# Patient Record
Sex: MALE
[De-identification: names, ages, dates, MRNs, and addresses within clinical notes are randomized per-mention and may not be internally consistent; named-entity substitution may affect disease eponyms.]

---

## 2024-03-19 PROBLEM — Z00.00 ENCOUNTER FOR PREVENTIVE HEALTH EXAMINATION: Status: ACTIVE | Noted: 2024-03-19

## 2024-04-02 ENCOUNTER — APPOINTMENT (OUTPATIENT)
Dept: UROLOGY | Facility: CLINIC | Age: 69
End: 2024-04-02
Payer: MEDICARE

## 2024-04-02 VITALS
DIASTOLIC BLOOD PRESSURE: 67 MMHG | HEART RATE: 81 BPM | TEMPERATURE: 98.5 F | BODY MASS INDEX: 37.51 KG/M2 | SYSTOLIC BLOOD PRESSURE: 111 MMHG | OXYGEN SATURATION: 97 % | WEIGHT: 283 LBS | RESPIRATION RATE: 14 BRPM | HEIGHT: 73 IN

## 2024-04-02 DIAGNOSIS — N40.1 BENIGN PROSTATIC HYPERPLASIA WITH LOWER URINARY TRACT SYMPMS: ICD-10-CM

## 2024-04-02 PROCEDURE — 99204 OFFICE O/P NEW MOD 45 MIN: CPT

## 2024-04-02 PROCEDURE — G2211 COMPLEX E/M VISIT ADD ON: CPT

## 2024-04-03 NOTE — HISTORY OF PRESENT ILLNESS
[FreeTextEntry1] : ARLENE MORALES Feb 1 1955   Language: English Date of First visit: 04/02/2024 Accompanied by: self Contact info: Referring Provider/PCP: Dr. Rogers Fax: [phone]   CC/ Problem List:  BPH/LUTs =============================================================================== FIRST VISIT / Summary: Very pleasant 69 year old M here for BPH/LUTs. Has frequency, urgency with sometimes incontinence and nocturia 2-3x. Sometimes weak stream, incomplete bladder emptying and hesitancy. Currently on tamsulosin 0.4 mg daily.  Most bothered by nocturia. Drinks 1.5 Liters water daily. Also drinks throughout the night 2-3 glasses if thirsty. Also currently fasting for Ramadan.   Reports mild constipation. Denies dysuria, hematuria, strain to void,  ------------------------------------------------------------------------------------------- INTERVAL VISITS:   ===============================================================================   PMH: BPH, DM, HTN,  Meds: aspirin, tamsulosin, metformin, amlodipine, furosemide, ezetimibe, potassium, Ozempic, ranolazine, senna, losartan, atorvastatin, labetalol All: denies FHx: no  malignancies SocHx: former smoker of hookah x 15 years quit 2012, no Etoh, Uber    PSH: hemorrhoids 2022, ?3 vessel CABG 2019, abdominal sx d/t trauma 2000   ROS: Review of Systems is as per HPI unless otherwise denoted below   =============================================================================== DATA: LABS (SELECTED):-----------------------------------------------------------------------------------------  3/19/24 Hgb A1c 5.9, BUN/Cr 24/1.80, eGFR 40, PSA 2.95   RADS:-------------------------------------------------------------------------------------------------------------------     PATHOLOGY/CYTOLOGY:-------------------------------------------------------------------------------------------     VOIDING STUDIES: ----------------------------------------------------------------------------------------  04/02/2024 PVR 0 ml      STONE STUDIES: (Analysis/LLSA)----------------------------------------------------------------------------------     PROCEDURES: -----------------------------------------------------------------------------------------------       =============================================================================== PHYSICAL EXAM:    FOCUSED: ----------------------------------------------------------------------------------------------  mani Cline NP  prostate non tender, no nodules appreciated, mildly enlarged ======================================================================================= DISCUSSION: ======================================================================================= ASSESSMENT and PLAN   1. BPH/LUTs  -UCx -recommend US for prostate size and can also consider cystoscopy -advised to restrict fluids 2-3 hours before bed, only sip at night if dry mouth -can consider taking tamsulosin 0.8 mg nightly -can consider dual therapy by adding Finasteride - based on US volume -recommend uroflow at next visit -f/u in 4 weeks   =======================================================================================   Thank you for allowing me to assist in the care of your patient. Should you have any questions please do not hesitate to reach out to me.     Bal Kamara MD                                                         [facility] Physician [facility]   [place] Office: [address], [address] [place] [address] T: [phone] F: [phone]   [place] Office: [address], 1st [place] [address] T: [phone] F: [phone]

## 2024-04-04 LAB — BACTERIA UR CULT: NORMAL

## 2024-04-11 ENCOUNTER — NON-APPOINTMENT (OUTPATIENT)
Age: 69
End: 2024-04-11

## 2024-04-16 ENCOUNTER — APPOINTMENT (OUTPATIENT)
Dept: UROLOGY | Facility: CLINIC | Age: 69
End: 2024-04-16
Payer: MEDICARE

## 2024-04-16 VITALS
HEART RATE: 72 BPM | RESPIRATION RATE: 14 BRPM | HEIGHT: 73 IN | SYSTOLIC BLOOD PRESSURE: 114 MMHG | WEIGHT: 280 LBS | OXYGEN SATURATION: 98 % | BODY MASS INDEX: 37.11 KG/M2 | DIASTOLIC BLOOD PRESSURE: 73 MMHG

## 2024-04-16 PROCEDURE — G2211 COMPLEX E/M VISIT ADD ON: CPT

## 2024-04-16 PROCEDURE — 99214 OFFICE O/P EST MOD 30 MIN: CPT

## 2024-04-16 RX ORDER — FINASTERIDE 5 MG/1
5 TABLET, FILM COATED ORAL DAILY
Qty: 90 | Refills: 3 | Status: ACTIVE | COMMUNITY
Start: 2024-04-16 | End: 1900-01-01

## 2024-04-16 NOTE — HISTORY OF PRESENT ILLNESS
"Rheumatology Clinic Visit      Bucky Edgar MRN# [id number]   YOB: 1958 Age: 60 year old      Date of visit: 11/13/18   PCP: Dr. Percy Deshpande    Chief Complaint   Patient presents with:  Follow Up For: Idiopathic chronic gout of multiple sites without tophus      Assessment and Plan     1.  Gout: Started having gout flares in approximately early 2017, acute onset and resolved with prednisone; joints involved include his left ankle, left first MTP, and left wrist.  Colchicine was cost prohibitive.  Currently on allopurinol 700 mg daily and is doing well with no gout flares.  Uric acid is low, lower than the standard \"less than 6\", but this level has achieved control of his gout flares.   - Continue allopurinol 700 mg daily (two 300mg tablets + one 100mg tablet)  - For gout flare only: prednisone 60mg daily x2days, then 40mg daily x5days, then 20mg daily x5days, then stop.  - Labs in 6 months: CBC, Cr, Hepatic Panel, Uric Acid    2. Hypertersion: Patient to follow up with Primary Care provider regarding elevated blood pressure.     3. Chronic low back pain: worse recently. Status-post 5 fusion surgeries per patient, last being about 20 years ago at the [facility].  He does not want to consider imaging or seeing a surgeon until he gets his work-comp issue figured out because he says that he should be \"covered for life\" for his back issues but needs to contact that work comp insurance first.  Will refer to orthopedic surgery at the [facility] where he was treated previously. He plans to make an appointment only after the insurance issue is figured out first.   - Orthopedic surgery referral    Mr. Edgar verbalized agreement with and understanding of the rational for the diagnosis and treatment plan.  All questions were answered to best of my ability and the patient's satisfaction. Mr. Edgar was advised to contact the clinic with any questions that may arise after the clinic visit.     Thank " [FreeTextEntry1] : ARLENE MORALES Feb 1 1955  Language: English Date of First visit: 04/02/2024 Accompanied by: self Contact info: Referring Provider/PCP: Dr. Rogers Fax: [phone]  CC/ Problem List: BPH/LUTs =============================================================================== FIRST VISIT / Summary: Very pleasant 69 year old M here for BPH/LUTs. Has frequency, urgency with sometimes incontinence and nocturia 2-3x. Sometimes weak stream, incomplete bladder emptying and hesitancy. Currently on tamsulosin 0.4 mg daily. Most bothered by nocturia. Drinks 1.5 Liters water daily. Also drinks throughout the night 2-3 glasses if thirsty. Also currently fasting for Ramadan. Reports mild constipation. Denies dysuria, hematuria, strain to void ------------------------------------------------------------------------------------------- INTERVAL VISITS: The patient's medications and allergies were reviewed and edited below. Dated 04/16/2024   ED: He has some morning erections, without meds 4-5/10 with sildenafil is sufficient.  ===============================================================================  PMH: BPH, DM, HTN, Meds: aspirin, tamsulosin, metformin, amlodipine, furosemide, ezetimibe, potassium, Ozempic, ranolazine, senna, losartan, atorvastatin, labetalol All: denies FHx: no  malignancies SocHx: former smoker of hookah x 15 years quit 2012, no Etoh, Uber   PSH: hemorrhoids 2022, ?3 vessel CABG 2019, abdominal sx d/t trauma 2000  ROS: Review of Systems is as per HPI unless otherwise denoted below  =============================================================================== DATA: LABS (SELECTED):----------------------------------------------------------------------------------------- 3/19/24 Hgb A1c 5.9, BUN/Cr 24/1.80, eGFR 40, PSA 2.95  RADS:------------------------------------------------------------------------------------------------------------------- 4/11/24: RBUS: kidneys normal, prostate 65.6cc PVR 6mL  PATHOLOGY/CYTOLOGY:-------------------------------------------------------------------------------------------   VOIDING STUDIES: ---------------------------------------------------------------------------------------- 04/02/2024 PVR 0 ml   STONE STUDIES: (Analysis/LLSA)----------------------------------------------------------------------------------   PROCEDURES: -----------------------------------------------------------------------------------------------    =============================================================================== PHYSICAL EXAM:   FOCUSED: ---------------------------------------------------------------------------------------------- mani Cline NP prostate non tender, no nodules appreciated, mildly enlarged ======================================================================================= DISCUSSION: ======================================================================================= ASSESSMENT and PLAN  1. BPH/LUTs -UCx negative -advised to restrict fluids 2-3 hours before bed, only sip at night if dry mouth -tamsulosin 0.8 mg nightly -start dual therapy by adding Finasteride - based on US volume -recommend uroflow at next visit   2. ED - has sildenafil from cardiologist. Happy with it  ======================================================================================= 4g Thank you for allowing me to assist in the care of your patient. Should you have any questions please do not hesitate to reach out to me.   Bal Kamara MD       [facility] Physician [facility]  [place] Office: [address], [address] [place] [address] T: [phone] F: [phone]  [place] Office: [address], 1st [place] [address] T: [phone] F: [phone]  you for involving me in the care of the patient    Return to clinic: 6 months      HPI   Bucky Edgar is a 60 year old male with a past medical history significant for COPD, dyslipidemia, vitamin D deficiency, history of rib fractures, DVT on anticoagulation, hypertension, who is seen for follow-up of gout.    Today, Mr. Edgar reports that he has not had a gout flare since last seen.  Tolerating allopurinol well.  He has not had to use colchicine for gout flare.  He used prednisone for his back pain that was helpful for his back, he did not use it for a gout flare.  He reports having 5 back fusion surgeries that were work comp related, 20 or more years ago at the [facility].  He says that he was recently given prednisone that was helpful for his back pain.  Back pain is worse when he lays flat, and worse with activity.      Denies fevers, chills, nausea, vomiting, constipation, diarrhea. No abdominal pain. No chest pain/pressure, palpitations, or shortness of breath. No LE swelling.  No rash.   No history of inflammatory eye disease or inflammatory bowel disease.      Tobacco: Quit in 2017  EtOH: 12 pack of beer every 2 weeks  Drugs: None    ROS   GEN: No fevers, chills, or night sweats  SKIN: No itching, rashes, sores  HEENT: No oral or nasal ulcers.  CV: No chest pain, pressure, palpitations, or dyspnea on exertion.  PULM: No SOB, wheeze, cough.  GI: No nausea, vomiting, constipation, diarrhea. No blood in stool. No abdominal pain.  : No blood in urine.  MSK: See HPI.  NEURO: No numbness, tingling, or weakness.  EXT: No LE swelling  PSYCH: Negative    Active Problem List     Patient Active Problem List   Diagnosis     Displacement of lumbar intervertebral disc without myelopathy     Tobacco use disorder     CARDIOVASCULAR SCREENING; LDL GOAL LESS THAN 130     HTN, goal below 140/90     Advanced directives, counseling/discussion     COPD (chronic obstructive pulmonary disease) (H)     Impaired  fasting glucose     Hypertriglyceridemia     Vitamin D deficiency     Chronic bronchitis with COPD (chronic obstructive pulmonary disease) (H)     Closed fracture of multiple ribs of left side with routine healing, subsequent encounter     Chronic pain due to trauma     Long-term (current) use of anticoagulants [Z79.01]     Elevated serum creatinine     History of deep venous thrombosis (DVT) of distal vein of left lower extremity     Past Medical History     Past Medical History:   Diagnosis Date     COPD (chronic obstructive pulmonary disease) (H)      Displacement of lumbar intervertebral disc without myelopathy 1995, 2002     HTN, goal below 140/90      Past Surgical History     Past Surgical History:   Procedure Laterality Date     C DECOMPRESS SPINAL CORD,1 SEG  1995, 4/2002    leftL4-L5, 2nd right L4-L5     C SPINE FUSION,ANTER,3 SGMTS  2/9/2004    ant and post fusion L4-S1     HC REPAIR ROTATOR CUFF,CHRONIC  1989     Allergy     Allergies   Allergen Reactions     Chlorthalidone Other (See Comments)     Excessive lowering of blood pressure     Amlodipine Other (See Comments)     Intractable headache with use of medication as well as noting a small tremor of the upper extremities     Current Medication List     Current Outpatient Prescriptions   Medication Sig     acetaminophen (TYLENOL) 325 MG tablet Take 650 mg by mouth every 6 hours     albuterol (PROAIR HFA, PROVENTIL HFA, VENTOLIN HFA) 108 (90 BASE) MCG/ACT inhaler Inhale 2 puffs into the lungs every 6 hours as needed for shortness of breath / dyspnea     allopurinol (ZYLOPRIM) 300 MG tablet Allopurinol 700mg daily (two 300mg tablets + one 100mg tablet)     aspirin 81 MG tablet Take 1 tablet (81 mg) by mouth daily     carvedilol (COREG) 25 MG tablet TAKE ONE TABLET BY MOUTH TWICE DAILY WITH MEALS     colchicine (COLCRYS) 0.6 MG tablet PRN gout flare: 1.2mg (2 tablets) once followed by 0.6mg (1 tablet) 1 hour later; do not take more than 1.8mg within a  "week period.     lisinopril (PRINIVIL/ZESTRIL) 40 MG tablet Take 1 tablet (40 mg) by mouth daily     nicotine polacrilex (NICORETTE) 2 MG lozenge Place 2 mg inside cheek every hour as needed for smoking cessation     NICOTINE STEP 1 21 MG/24HR 24 hr patch ROBBY 1 PATCH TO SKIN ONCE D X 42 DAYS     predniSONE (DELTASONE) 20 MG tablet Take 1 tablet (20 mg) by mouth 2 times daily     sennosides (SENOKOT) 8.6 MG tablet TK 1 T PO BID     No current facility-administered medications for this visit.          Social History   See HPI    Family History     Family History   Problem Relation Age of Onset     Family History Negative Other      Diabetes No family hx of      Coronary Artery Disease No family hx of      Hypertension No family hx of      Hyperlipidemia No family hx of      Breast Cancer No family hx of      Prostate Cancer No family hx of      Anxiety Disorder No family hx of      Substance Abuse No family hx of      Asthma No family hx of      Thyroid Disease No family hx of      Unknown/Adopted No family hx of      Genetic Disorder No family hx of      Osteoporosis No family hx of      Anesthesia Reaction No family hx of      Mental Illness No family hx of      Depression No family hx of      Other Cancer No family hx of      Colon Cancer No family hx of      Cerebrovascular Disease No family hx of      Obesity No family hx of        Physical Exam     Temp Readings from Last 3 Encounters:   11/01/18 98.6  F (37  C) (Temporal)   07/03/18 98.4  F (36.9  C) (Oral)   05/02/18 98.9  F (37.2  C) (Temporal)     BP Readings from Last 5 Encounters:   11/13/18 (!) 149/91   11/01/18 114/82   07/03/18 (!) 198/118   05/02/18 136/86   04/04/18 132/84     Pulse Readings from Last 1 Encounters:   11/13/18 73     Resp Readings from Last 1 Encounters:   11/01/18 18     Estimated body mass index is 34.11 kg/(m^2) as calculated from the following:    Height as of 11/1/18: 1.753 m (5' 9\").    Weight as of this encounter: 104.8 kg (231 " lb).    GEN: NAD; overweight  HEENT: MMM. No oral lesions.  Anicteric, noninjected sclera  CV: S1, S2. RRR. No m/r/g.  PULM: CTA bilaterally. No w/c.  ABD: +BS.  MSK: MCPs, PIPs, wrists, elbows, shoulders, knees, and ankles without swelling or tenderness to palpation.  Negative MCP and MTP squeeze.   Hips nontender to palpation.    NEURO: UE and LE strengths 5/5 and equal bilaterally.   SKIN: No rash  EXT: No LE edema  PSYCH: Alert. Appropriate.    Labs / Imaging (select studies)   CBC  Recent Labs   Lab Test  08/07/18   0926  05/02/18   1039  04/04/18   1023  03/23/18   0918   WBC  7.6  11.1*  12.4*  13.0*   RBC  5.65  4.83  4.82  4.95   HGB  17.8*  15.0  15.0  15.5   HCT  54.3*  45.5  46.1  46.8   MCV  96  94  96  95   RDW  15.4*  14.5  13.9  13.6   PLT  241  230  228  307   MCH  31.5  31.1  31.1  31.3   MCHC  32.8  33.0  32.5  33.1   NEUTROPHIL  68.6   --   73.1  72.5   LYMPH  19.4   --   15.2  13.3   MONOCYTE  8.3   --   8.4  12.4   EOSINOPHIL  3.2   --   3.1  1.2   BASOPHIL  0.5   --   0.2  0.6   ANEU  5.2   --   9.0*  9.4*   ALYM  1.5   --   1.9  1.7   LIZZ  0.6   --   1.0  1.6*   AEOS  0.2   --   0.4  0.2   ABAS  0.0   --   0.0  0.1     CMP  Recent Labs   Lab Test  08/07/18   0926  05/02/18   1039  03/23/18   0918  02/28/18   1047   NA   --   142  140  139   POTASSIUM   --   4.4  3.9  5.0   CHLORIDE   --   111*  108  106   CO2   --   27  21  27   ANIONGAP   --   4  11  6   GLC   --   102*  106*  107*   BUN   --   15  10  17   CR  1.16  1.11  1.06  1.27*   GFRESTIMATED  64  67  71  58*   GFRESTBLACK  77  82  86  70   JUSTIN   --   8.9  9.4  9.2   BILITOTAL  0.4  0.8   --   0.7   ALBUMIN  3.3*  3.4   --   3.2*   PROTTOTAL  6.8  6.8   --   6.7*   ALKPHOS  108  67   --   72   AST  17  18   --   14   ALT  29  26   --   31     Uric Acid  Recent Labs   Lab Test  08/07/18   0926  05/02/18   1039  03/23/18   0918  02/28/18   1047  02/05/18   1212  12/12/16   1055   URIC  2.0*  3.3*  5.0  6.9  7.8*  7.1     Immunization  History     Immunization History   Administered Date(s) Administered     Influenza Vaccine IM 3yrs+ 4 Valent IIV4 11/27/2013, 10/29/2015, 12/12/2016, 10/23/2017, 11/01/2018     Mantoux Tuberculin Skin Test 07/17/2012     Pneumo Conj 13-V (2010&after) 01/22/2016     Pneumococcal 23 valent 11/27/2013     TDAP Vaccine (Adacel) 08/25/2009     TDAP Vaccine (Boostrix) 08/25/2009          Chart documentation done in part with Dragon Voice recognition Software. Although reviewed after completion, some word and grammatical error may remain.    Coy Bolaños MD

## 2024-07-11 ENCOUNTER — INPATIENT (INPATIENT)
Facility: HOSPITAL | Age: 69
LOS: 2 days | Discharge: HOME | DRG: 311 | End: 2024-07-14
Attending: INTERNAL MEDICINE | Admitting: INTERNAL MEDICINE
Payer: COMMERCIAL

## 2024-07-11 VITALS
SYSTOLIC BLOOD PRESSURE: 84 MMHG | HEIGHT: 73 IN | HEART RATE: 95 BPM | OXYGEN SATURATION: 96 % | TEMPERATURE: 98 F | DIASTOLIC BLOOD PRESSURE: 54 MMHG | WEIGHT: 270.95 LBS | RESPIRATION RATE: 16 BRPM

## 2024-07-11 DIAGNOSIS — I20.0 UNSTABLE ANGINA: ICD-10-CM

## 2024-07-11 DIAGNOSIS — I10 ESSENTIAL (PRIMARY) HYPERTENSION: ICD-10-CM

## 2024-07-11 DIAGNOSIS — E78.5 HYPERLIPIDEMIA, UNSPECIFIED: ICD-10-CM

## 2024-07-11 DIAGNOSIS — I25.10 ATHEROSCLEROTIC HEART DISEASE OF NATIVE CORONARY ARTERY WITHOUT ANGINA PECTORIS: ICD-10-CM

## 2024-07-11 DIAGNOSIS — R42 DIZZINESS AND GIDDINESS: ICD-10-CM

## 2024-07-11 DIAGNOSIS — Z87.438 PERSONAL HISTORY OF OTHER DISEASES OF MALE GENITAL ORGANS: ICD-10-CM

## 2024-07-11 DIAGNOSIS — N17.9 ACUTE KIDNEY FAILURE, UNSPECIFIED: ICD-10-CM

## 2024-07-11 DIAGNOSIS — I25.10 ATHEROSCLEROTIC HEART DISEASE OF NATIVE CORONARY ARTERY WITHOUT ANGINA PECTORIS: Chronic | ICD-10-CM

## 2024-07-11 DIAGNOSIS — E11.9 TYPE 2 DIABETES MELLITUS WITHOUT COMPLICATIONS: ICD-10-CM

## 2024-07-11 DIAGNOSIS — R07.9 CHEST PAIN, UNSPECIFIED: ICD-10-CM

## 2024-07-11 DIAGNOSIS — Z29.9 ENCOUNTER FOR PROPHYLACTIC MEASURES, UNSPECIFIED: ICD-10-CM

## 2024-07-11 DIAGNOSIS — E87.6 HYPOKALEMIA: ICD-10-CM

## 2024-07-11 LAB
ALBUMIN SERPL ELPH-MCNC: 3.5 G/DL — SIGNIFICANT CHANGE UP (ref 3.5–5)
ALP SERPL-CCNC: 78 U/L — SIGNIFICANT CHANGE UP (ref 40–120)
ALT FLD-CCNC: 36 U/L DA — SIGNIFICANT CHANGE UP (ref 10–60)
ANION GAP SERPL CALC-SCNC: 8 MMOL/L — SIGNIFICANT CHANGE UP (ref 5–17)
APPEARANCE UR: CLEAR — SIGNIFICANT CHANGE UP
APTT BLD: 31.6 SEC — SIGNIFICANT CHANGE UP (ref 24.5–35.6)
AST SERPL-CCNC: 25 U/L — SIGNIFICANT CHANGE UP (ref 10–40)
BASOPHILS # BLD AUTO: 0.02 K/UL — SIGNIFICANT CHANGE UP (ref 0–0.2)
BASOPHILS NFR BLD AUTO: 0.2 % — SIGNIFICANT CHANGE UP (ref 0–2)
BILIRUB SERPL-MCNC: 0.7 MG/DL — SIGNIFICANT CHANGE UP (ref 0.2–1.2)
BILIRUB UR-MCNC: NEGATIVE — SIGNIFICANT CHANGE UP
BUN SERPL-MCNC: 26 MG/DL — HIGH (ref 7–18)
CALCIUM SERPL-MCNC: 9 MG/DL — SIGNIFICANT CHANGE UP (ref 8.4–10.5)
CHLORIDE SERPL-SCNC: 104 MMOL/L — SIGNIFICANT CHANGE UP (ref 96–108)
CO2 SERPL-SCNC: 28 MMOL/L — SIGNIFICANT CHANGE UP (ref 22–31)
COLOR SPEC: YELLOW — SIGNIFICANT CHANGE UP
CREAT ?TM UR-MCNC: 250 MG/DL — SIGNIFICANT CHANGE UP
CREAT SERPL-MCNC: 2.8 MG/DL — HIGH (ref 0.5–1.3)
DIFF PNL FLD: NEGATIVE — SIGNIFICANT CHANGE UP
EGFR: 24 ML/MIN/1.73M2 — LOW
EOSINOPHIL # BLD AUTO: 0.1 K/UL — SIGNIFICANT CHANGE UP (ref 0–0.5)
EOSINOPHIL NFR BLD AUTO: 0.9 % — SIGNIFICANT CHANGE UP (ref 0–6)
EPI CELLS # UR: PRESENT
GAS PNL BLDV: SIGNIFICANT CHANGE UP
GLUCOSE BLDC GLUCOMTR-MCNC: 95 MG/DL — SIGNIFICANT CHANGE UP (ref 70–99)
GLUCOSE SERPL-MCNC: 100 MG/DL — HIGH (ref 70–99)
GLUCOSE UR QL: 100 MG/DL
HCT VFR BLD CALC: 39.1 % — SIGNIFICANT CHANGE UP (ref 39–50)
HGB BLD-MCNC: 13.1 G/DL — SIGNIFICANT CHANGE UP (ref 13–17)
IMM GRANULOCYTES NFR BLD AUTO: 0.4 % — SIGNIFICANT CHANGE UP (ref 0–0.9)
INR BLD: 1.05 RATIO — SIGNIFICANT CHANGE UP (ref 0.85–1.18)
KETONES UR-MCNC: ABNORMAL MG/DL
LEUKOCYTE ESTERASE UR-ACNC: NEGATIVE — SIGNIFICANT CHANGE UP
LYMPHOCYTES # BLD AUTO: 1.65 K/UL — SIGNIFICANT CHANGE UP (ref 1–3.3)
LYMPHOCYTES # BLD AUTO: 15.5 % — SIGNIFICANT CHANGE UP (ref 13–44)
MAGNESIUM SERPL-MCNC: 1.7 MG/DL — SIGNIFICANT CHANGE UP (ref 1.6–2.6)
MCHC RBC-ENTMCNC: 30.8 PG — SIGNIFICANT CHANGE UP (ref 27–34)
MCHC RBC-ENTMCNC: 33.5 GM/DL — SIGNIFICANT CHANGE UP (ref 32–36)
MCV RBC AUTO: 92 FL — SIGNIFICANT CHANGE UP (ref 80–100)
MONOCYTES # BLD AUTO: 0.99 K/UL — HIGH (ref 0–0.9)
MONOCYTES NFR BLD AUTO: 9.3 % — SIGNIFICANT CHANGE UP (ref 2–14)
NEUTROPHILS # BLD AUTO: 7.83 K/UL — HIGH (ref 1.8–7.4)
NEUTROPHILS NFR BLD AUTO: 73.7 % — SIGNIFICANT CHANGE UP (ref 43–77)
NITRITE UR-MCNC: NEGATIVE — SIGNIFICANT CHANGE UP
NRBC # BLD: 0 /100 WBCS — SIGNIFICANT CHANGE UP (ref 0–0)
OSMOLALITY UR: 601 MOS/KG — SIGNIFICANT CHANGE UP (ref 50–1200)
PH UR: 5.5 — SIGNIFICANT CHANGE UP (ref 5–8)
PLATELET # BLD AUTO: 197 K/UL — SIGNIFICANT CHANGE UP (ref 150–400)
POTASSIUM SERPL-MCNC: 3.3 MMOL/L — LOW (ref 3.5–5.3)
POTASSIUM SERPL-SCNC: 3.3 MMOL/L — LOW (ref 3.5–5.3)
PROT SERPL-MCNC: 7.5 G/DL — SIGNIFICANT CHANGE UP (ref 6–8.3)
PROT UR-MCNC: 30 MG/DL
PROTHROM AB SERPL-ACNC: 11.9 SEC — SIGNIFICANT CHANGE UP (ref 9.5–13)
RBC # BLD: 4.25 M/UL — SIGNIFICANT CHANGE UP (ref 4.2–5.8)
RBC # FLD: 12.4 % — SIGNIFICANT CHANGE UP (ref 10.3–14.5)
RBC CASTS # UR COMP ASSIST: 0 /HPF — SIGNIFICANT CHANGE UP (ref 0–4)
SODIUM SERPL-SCNC: 140 MMOL/L — SIGNIFICANT CHANGE UP (ref 135–145)
SODIUM UR-SCNC: 6 MMOL/L — SIGNIFICANT CHANGE UP
SP GR SPEC: 1.07 — HIGH (ref 1–1.03)
TROPONIN I, HIGH SENSITIVITY RESULT: 20.1 NG/L — SIGNIFICANT CHANGE UP
TROPONIN I, HIGH SENSITIVITY RESULT: 27.9 NG/L — SIGNIFICANT CHANGE UP
UROBILINOGEN FLD QL: 0.2 MG/DL — SIGNIFICANT CHANGE UP (ref 0.2–1)
WBC # BLD: 10.63 K/UL — HIGH (ref 3.8–10.5)
WBC # FLD AUTO: 10.63 K/UL — HIGH (ref 3.8–10.5)
WBC UR QL: 4 /HPF — SIGNIFICANT CHANGE UP (ref 0–5)

## 2024-07-11 PROCEDURE — 93010 ELECTROCARDIOGRAM REPORT: CPT

## 2024-07-11 PROCEDURE — 99291 CRITICAL CARE FIRST HOUR: CPT

## 2024-07-11 PROCEDURE — 71275 CT ANGIOGRAPHY CHEST: CPT | Mod: 26,MC

## 2024-07-11 PROCEDURE — 74174 CTA ABD&PLVS W/CONTRAST: CPT | Mod: 26,MC

## 2024-07-11 RX ORDER — TAMSULOSIN HYDROCHLORIDE 0.4 MG/1
0.4 CAPSULE ORAL AT BEDTIME
Refills: 0 | Status: DISCONTINUED | OUTPATIENT
Start: 2024-07-11 | End: 2024-07-14

## 2024-07-11 RX ORDER — ASPIRIN 325 MG/1
162 TABLET, FILM COATED ORAL ONCE
Refills: 0 | Status: COMPLETED | OUTPATIENT
Start: 2024-07-11 | End: 2024-07-11

## 2024-07-11 RX ORDER — INSULIN LISPRO 100 [IU]/ML
INJECTION, SOLUTION SUBCUTANEOUS AT BEDTIME
Refills: 0 | Status: DISCONTINUED | OUTPATIENT
Start: 2024-07-11 | End: 2024-07-14

## 2024-07-11 RX ORDER — MECLIZINE HCL 25 MG
12.5 TABLET ORAL
Refills: 0 | Status: DISCONTINUED | OUTPATIENT
Start: 2024-07-11 | End: 2024-07-14

## 2024-07-11 RX ORDER — ATORVASTATIN CALCIUM 20 MG/1
80 TABLET, FILM COATED ORAL AT BEDTIME
Refills: 0 | Status: DISCONTINUED | OUTPATIENT
Start: 2024-07-11 | End: 2024-07-14

## 2024-07-11 RX ORDER — INSULIN LISPRO 100 [IU]/ML
INJECTION, SOLUTION SUBCUTANEOUS
Refills: 0 | Status: DISCONTINUED | OUTPATIENT
Start: 2024-07-11 | End: 2024-07-14

## 2024-07-11 RX ORDER — HEPARIN SODIUM 50 [USP'U]/ML
5000 INJECTION, SOLUTION INTRAVENOUS EVERY 12 HOURS
Refills: 0 | Status: DISCONTINUED | OUTPATIENT
Start: 2024-07-11 | End: 2024-07-14

## 2024-07-11 RX ORDER — POTASSIUM CHLORIDE 600 MG/1
40 TABLET, FILM COATED, EXTENDED RELEASE ORAL ONCE
Refills: 0 | Status: COMPLETED | OUTPATIENT
Start: 2024-07-11 | End: 2024-07-11

## 2024-07-11 RX ORDER — LABETALOL HYDROCHLORIDE 300 MG/1
200 TABLET ORAL DAILY
Refills: 0 | Status: DISCONTINUED | OUTPATIENT
Start: 2024-07-11 | End: 2024-07-12

## 2024-07-11 RX ORDER — SENNOSIDES 8.6 MG
2 TABLET ORAL AT BEDTIME
Refills: 0 | Status: DISCONTINUED | OUTPATIENT
Start: 2024-07-11 | End: 2024-07-14

## 2024-07-11 RX ORDER — ASPIRIN 325 MG/1
81 TABLET, FILM COATED ORAL DAILY
Refills: 0 | Status: DISCONTINUED | OUTPATIENT
Start: 2024-07-11 | End: 2024-07-14

## 2024-07-11 RX ORDER — DEXTROSE MONOHYDRATE AND SODIUM CHLORIDE 5; .3 G/100ML; G/100ML
1000 INJECTION, SOLUTION INTRAVENOUS
Refills: 0 | Status: DISCONTINUED | OUTPATIENT
Start: 2024-07-11 | End: 2024-07-12

## 2024-07-11 RX ORDER — RANOLAZINE 500 MG/1
1000 TABLET, EXTENDED RELEASE ORAL DAILY
Refills: 0 | Status: DISCONTINUED | OUTPATIENT
Start: 2024-07-11 | End: 2024-07-12

## 2024-07-11 RX ADMIN — Medication 2 TABLET(S): at 22:32

## 2024-07-11 RX ADMIN — HEPARIN SODIUM 5000 UNIT(S): 50 INJECTION, SOLUTION INTRAVENOUS at 19:42

## 2024-07-11 RX ADMIN — TAMSULOSIN HYDROCHLORIDE 0.4 MILLIGRAM(S): 0.4 CAPSULE ORAL at 22:32

## 2024-07-11 RX ADMIN — POTASSIUM CHLORIDE 40 MILLIEQUIVALENT(S): 600 TABLET, FILM COATED, EXTENDED RELEASE ORAL at 19:41

## 2024-07-11 RX ADMIN — ATORVASTATIN CALCIUM 80 MILLIGRAM(S): 20 TABLET, FILM COATED ORAL at 22:32

## 2024-07-11 RX ADMIN — ASPIRIN 162 MILLIGRAM(S): 325 TABLET, FILM COATED ORAL at 19:40

## 2024-07-11 NOTE — H&P ADULT - HISTORY OF PRESENT ILLNESS
68 yo M with PMH of HTN, DM CAD s/p 3 stents (2019) present with dizziness ( room spinning sensation), constant sharp left sided chest pain that radiates to his upper back, which started this morning at 8am . Accompanied with 2 days history of  dry cough. Patient reports that the dizziness and chest pain started while he  was on the bus this morning. Patient reports that he felt so dizzy that he fell to the ground. Patient denies any head trauma, LOC. Patient states the chest pain is worse with exertion but improves with rest. Patient denies history of similar symptoms, nausea, vomiting, abdominal pain, palpitations, shortness of breath,  68 yo M with PMH of HTN, DM, BPH, CAD s/p 3 stents (2019) present with dizziness ( room spinning sensation), constant sharp left sided chest pain that radiates to his upper back, which started this morning at 8am . Accompanied with 2 days history of  dry cough. Patient reports that the dizziness and chest pain started while he  was on the bus this morning. Patient reports that he felt so dizzy that he fell to the ground. Patient denies any head trauma, LOC. Patient states the chest pain is worse with exertion but improves with rest. Patient denies history of similar symptoms, nausea, vomiting, abdominal pain, palpitations, shortness of breath,     In the ED  v/s; BP 84/54, HR 95, Temp 97.6, 96% RA   repeat /67  Labs WBC 10k. Trop 27.9, 20, K 3.3, Cr 2.8   VBG 7.34/57/28/31  EK BPM, NSR  CTA chest: neg   CTA abdomen & pelvis: 3.2cm gall stone in the underdistended gall bladder  s/p Aspirin 162    70 yo M with PMH of HTN, DM, BPH, CAD s/p 3 stents (2019) s/p CABG   present with dizziness ( room spinning sensation), constant sharp left sided chest pain that radiates to his upper back, which started this morning at 8am . Accompanied with 2 days history of  dry cough. Patient reports that the dizziness and chest pain started while he  was on the bus this morning. Patient reports that he felt so dizzy that he fell to the ground. Patient denies any head trauma, LOC. Patient states the chest pain is worse with exertion but improves with rest. Patient denies history of similar symptoms, nausea, vomiting, abdominal pain, palpitations, shortness of breath,     In the ED  v/s; BP 84/54, HR 95, Temp 97.6, 96% RA   repeat /67  Labs WBC 10k. Trop 27.9, 20, K 3.3, Cr 2.8   VBG 7.34/57/28/31  EK BPM, NSR  CTA chest: neg   CTA abdomen & pelvis: 3.2cm gall stone in the underdistended gall bladder  s/p Aspirin 162

## 2024-07-11 NOTE — H&P ADULT - NSHPLANGTRANSLATORFT_GEN_A_CORE
Kvng Estrada Patient was advised to hold her xarelto 2 days before her surgery  Patient is aware and understood

## 2024-07-11 NOTE — H&P ADULT - VTE RISK ASSESSMENT
[FreeTextEntry1] : COVID 19\par  symptoms will likely resolve in 7-10 days but my worsen in 4-5 days. Recommend treatment focusing on decreasing severity of symptoms including salt gargles, over the counter Cepacol lozenges as needed for sore throat, over the counter saline nasal spray as needed for congestion. No antibiotics at this time. Wash hands to prevent the spread of infection, drink plenty of fluids, get appropriate rest, and maintain adequate nutrition. Follow up in 4-5 days if symptoms worsen or if symptoms persists beyond 14 days. Cough my persist for up to 3 weeks after other symptoms resolve. Advised patient to refer to emergency room if temperature 104 or greater, confusion, severe headache, neck pain, stiffness, inability to swallow, difficulty breathing, drooling, chest pain, abdominal pain, vomiting or shortness of breath.\par \par start ondansetron for nausea\par recommend imodium for diarrhea\par monitor for symptoms of constipation\par RTO if symptoms do not improve
VTE Assessment already completed for this visit

## 2024-07-11 NOTE — H&P ADULT - NSICDXPASTMEDICALHX_GEN_ALL_CORE_FT
PAST MEDICAL HISTORY:  CAD (coronary artery disease)     DM (diabetes mellitus)     History of BPH     HLD (hyperlipidemia)     HTN (hypertension)

## 2024-07-11 NOTE — H&P ADULT - PROBLEM SELECTOR PLAN 2
p/w dizziness (room spinning sensation)   v/s; BP 84/54, HR 95, Temp 97.6, 96% RA   repeat /67  Likely vertigo vs orthostatic hypotension  EK BPM, NSR  - Fall precaution  - Orthostatic BP p/w dizziness (room spinning sensation)   v/s; BP 84/54, HR 95, Temp 97.6, 96% RA   repeat /67  Likely vertigo vs orthostatic hypotension  EK BPM, NSR  - Fall precaution  - Orthostatic BP  - Meclizine 12.5mg BID prn p/w dizziness (room spinning sensation)   v/s; BP 84/54, HR 95, Temp 97.6, 96% RA   repeat /67  Likely vertigo vs orthostatic hypotension  EK BPM, NSR  - Fall precaution  - Orthostatic BP  - Meclizine 12.5mg BID prn  - f/u CT Head  - Consult neuro in the AM

## 2024-07-11 NOTE — H&P ADULT - ASSESSMENT
70 yo M with PMH of HTN, DM, BPH, CAD s/p 3 stents () present with dizziness ( room spinning sensation), constant sharp left sided chest pain that radiates to his upper back, which started this morning at 8am. Admitted to telemetry for Chest pain, Dizziness, RANDELL        v/s; BP 84/54, HR 95, Temp 97.6, 96% RA   repeat /67  Labs WBC 10k. Trop 27.9, 20, K 3.3, Cr 2.8   VBG 7.34/57/28/31  EK BPM, NSR  CTA chest: neg   CTA abdomen & pelvis: 3.2cm gall stone in the underdistended gall bladder  s/p Aspirin 162    70 yo M with PMH of HTN, DM, BPH, CAD s/p 3 stents (2019) present with dizziness ( room spinning sensation), constant sharp left sided chest pain that radiates to his upper back, which started this morning at 8am. Admitted to telemetry for Chest pain, Dizziness, RANDELL

## 2024-07-11 NOTE — ED PROVIDER NOTE - ATTENDING CONTRIBUTION TO CARE
Abnormal presenting vital signs requiring emergent rule out of aortic dissection.    I was not directly involved in the care of this patient.  Patient was seen as an attending only visit by Dr Sol Hall (Angelo) as attending physician,  I am cosigning this chart due to a technical issue with the EMR where she was not able to assign herself in her correct role.  Leland Chavez M.D.

## 2024-07-11 NOTE — H&P ADULT - PROBLEM SELECTOR PLAN 8
Hx of BPH, on Tamsulosin 0.4mg QD  - c/w home med Hx of HTN on Labetalol 200mg QD, Losartan 50mg QD   - Hold Losartan in the setting of RANDELL   - c/w Labetalol 200mg QD med  with parameters.  - DASH diet

## 2024-07-11 NOTE — ED PROVIDER NOTE - PROGRESS NOTE DETAILS
Sol Hall (Rodriguez), DO spoke to radiology, cleared to proceed with CT despite eGFR. will hydrate once dissection ruled out. Sol (Angelo) Isabel, DO pt has been feeling improved. BP 130s without intervention. CT negative. trops flat. accepted to unattached on tele. MAR aware. pt agreeable.

## 2024-07-11 NOTE — H&P ADULT - PROBLEM SELECTOR PLAN 5
Hx of DM on Ozempic 1mg QW  - Hold home med   - SSI, FS  - Diabetic diet  - f/u A1c Hx of CAD s/p 3 stents ( 2019). On Aspirin 81mg, Atorvastatin 80mg, Ranolazine 1000mg QD  - c/w home med

## 2024-07-11 NOTE — ED PROVIDER NOTE - NS ED ROS FT
Constitutional: no fevers, chills  HEENT: no HA, vision changes, rhinorrhea, sore throat  Cardiac: +chest pain, no palpitations  Respiratory: +SOB, no cough or hemoptysis  GI: no n/v/d/c, abd pain, bloody or dark stools  : no dysuria, frequency, or hematuria  MSK: no joint pain, neck pain or back pain  Skin: no rashes, jaundice, pruritis  Neuro: no numbness/tingling, + gen weakness, no unsteady gait  ROS otherwise neg except per MDM

## 2024-07-11 NOTE — ED ADULT NURSE NOTE - CAS EDP DISCH DISPOSITION ADMI
Verified Results  (1) TSH [id number] 01:26PM Tyshawn Edwards Order Number: [id number]     Test Name Result Flag Reference   TSH 3 060 uIU/mL  0 [id number] [id number]   - Patient Instructions: This bloodwork is non-fasting  Please drink two glasses of water morning of bloodwork  - Patient Instructions: This bloodwork is non-fasting  Please drink two glasses of water morning of bloodwork  Patients undergoing fluorescein dye angiography may retain small amounts of fluorescein in the body for 48-72 hours post procedure  Samples containing fluorescein can produce falsely depressed TSH values  If the patient had this procedure,a specimen should be resubmitted post fluorescein clearance            The recommended reference ranges for TSH during pregnancy are as follows:  First trimester 0 1 to 2 5 uIU/mL  Second trimester  0 2 to 3 0 uIU/mL  Third trimester 0 3 to 3 0 uIU/m Telemetry

## 2024-07-11 NOTE — H&P ADULT - PROBLEM SELECTOR PLAN 3
p/w  SCr 2.2 on admission  Unknown baseline   - c/w IVF for now  - f/u urine Lytes,   - f/u BMP  - Avoid Nephrotoxic agent. p/w  SCr 2.2 on admission  Unknown baseline   likely pre renal   - c/w IVF for now  - f/u urine Lytes,   - f/u BMP  - Avoid Nephrotoxic agent. p/w  SCr 2.2 on admission  Unknown baseline   likely pre renal   Bladder scan 0  - c/w IVF for now  - f/u urine Lytes,   - f/u BMP  - Avoid Nephrotoxic agent. p/w  SCr 2.2 on admission  Unknown baseline   likely pre renal   Bladder scan 0  - c/w IVF for now  - Avoid Nephrotoxic agent.  - f/u urine Lytes,   - f/u BMP  - f/u UA   - f/u US Renal

## 2024-07-11 NOTE — ED PROVIDER NOTE - CLINICAL SUMMARY MEDICAL DECISION MAKING FREE TEXT BOX
69-year-old male history of hypertension, hyperlipidemia, non-insulin-dependent diabetes presents for 2 presyncopal episodes today with associated chest pressure radiating to the back with diaphoresis nausea and shortness of breath.  Patient is a former smoker no pleuritic chest pain, vomiting, bloody stools, abdominal pain.  Denies any numbness tingling weakness or head strike.  Arrives hypotensive to the 80s with diaphoresis.  EKG normal sinus rhythm with lateral T wave flattening/inversion.  No ST elevations.  Normal intervals.  Otherwise no cardiac murmurs, rales, abdominal tenderness, pitting edema.  Given presyncope and hypotension assess for dissection stat CT, ACS/HF rule out/admission for high risk chest pain.

## 2024-07-11 NOTE — H&P ADULT - PROBLEM SELECTOR PLAN 1
p/w chest pain  EK BPM, NSR  CTA chest: neg   CTA abdomen & pelvis: 3.2cm gall stone in the underdistended gall bladder  Trop 27.9,  20  s/p loading dose Aspirin  STEFFI score  HEART score  - Admit to Tele  - Started Aspirin 81mg, Atorvastatin, Metoprolol   - Cardio consulted: p/w chest pain  EK BPM, NSR  CTA chest: neg   CTA abdomen & pelvis: 3.2cm gall stone in the underdistended gall bladder  Trop 27.9,  20  s/p loading dose Aspirin  STEFFI score: 4 points (20% risk at 14 days of: all-cause mortality, new or recurrent MI, or severe recurrent ischemia requiring urgent revascularization.)  HEART score: 5 points Moderate Score (4-6 points) Risk of MACE of 12-16.6%.  - Admit to Tele  - c/w Aspirin 81mg, Atorvastatin,   - Cardio consulted: p/w  constant chest pain that radiates to the back and improves with rest   Home med: Ranolazine   likely stable angina vs unstable anginal   EK BPM, NSR  CTA chest: neg   CTA abdomen & pelvis: 3.2cm gall stone in the underdistended gall bladder  Trop 27.9,  20  s/p loading dose Aspirin  STEFFI score: 4 points (20% risk at 14 days of: all-cause mortality, new or recurrent MI, or severe recurrent ischemia requiring urgent revascularization.)  HEART score: 5 points Moderate Score (4-6 points) Risk of MACE of 12-16.6%.  - Admit to Tele  - Vitals Q4hrs,  - c/w Aspirin 81mg, Atorvastatin,   - f/u echo   - Cardio consulted Jose

## 2024-07-11 NOTE — ED PROVIDER NOTE - PHYSICAL EXAMINATION
General: fatigued appearing  HEENT: NCAT, PERRL  Cardiac: RRR, no murmurs, peripheral pulses palpable, but stronger on R  Resp: CTAB  Abdomen: soft, non-distended, bowel sounds present, no ttp, no rebound or guarding. no organomegaly  Extremities: no peripheral edema, calf tenderness, or leg size discrepancies  Skin: no rashes. diaphoretic  Neuro: AAOx4, 5+motor, sensation grossly intact  Psych: mood and affect appropriate

## 2024-07-11 NOTE — H&P ADULT - PROBLEM SELECTOR PLAN 7
Hx of HTN on Labetalol 200mg QD, Losartan 50mg QD   - Hold Losartan in the setting of RANDELL   - c/w Labetalol 200mg QD med  with parameters.  - DASH diet Hx of HLD on Atorvastatin 80mg QD, Ezetimibe 10mg QD  - c/w home med

## 2024-07-11 NOTE — H&P ADULT - ATTENDING COMMENTS
70 yo M with PMH of HTN, DM, BPH, CAD s/p 3 stents (2019) s/p CABG   present with dizziness ( room spinning sensation), constant sharp left sided chest pain that radiates to his upper back, which started this morning at 8am . Accompanied with 2 days history of  dry cough. Patient reports that the dizziness and chest pain started while he  was on the bus this morning. Patient reports that he felt so dizzy that he fell to the ground. Patient denies any head trauma, LOC. Patient states the chest pain is worse with exertion but improves with rest. Patient denies history of similar symptoms, nausea, vomiting, abdominal pain, palpitations, shortness of breath,   seen and examined vsstable   d/w er md and resident   pt is feeling dizzy 3-4 weeks  on and off  has been seen by ENT     today when left home was mildly dizzy and when came out of Bus had a fall had no cp at that time  but when he got up felt CP in lower chest   also some neck pain   has 2-3 days cough without fever   vs stable afebrile  perrla eomi  no icterus    lung clear  heart abd ok   c spine nt  moving neck side by side or up and down  no dizzyness    nor orthostatic   chest  left lower abd tenderness   cns  no cerebellar dysf  labs noted craet 2.8  wbc 10.6   ct chest abd ok    a/p dizzyness  telem  card neurology  echo   ct head  renal insu  acute vs chroni  repa will get nephro

## 2024-07-11 NOTE — H&P ADULT - NSHPPHYSICALEXAM_GEN_ALL_CORE
GENERAL: NAD  HEAD:  Atraumatic, Normocephalic  EYES:, conjunctiva and sclera clear  ENMT: ; Moist mucous membranes,   NECK: Supple, normal appearance,    CHEST/LUNG: Lungs clear to auscultation bilaterally, No rales, rhonchi, wheezing   CHEST WALL: No tenderness   HEART: Regular rate and rhythm; No murmurs, rubs, or gallops  ABDOMEN: Soft, Nontender, Nondistended; Bowel sounds present  EXTREMITIES:  2+ Peripheral Pulses, No clubbing, cyanosis, or edema  NERVOUS SYSTEM:  Alert & Oriented X3,   SKIN: No rashes or lesions.

## 2024-07-11 NOTE — H&P ADULT - NSHPREVIEWOFSYSTEMS_GEN_ALL_CORE
REVIEW OF SYSTEMS:  CONSTITUTIONAL: No fevers or chills  EYES: No conjunctiva redness, No eye discharge  ENT: No nasal congestion, No sore throat, No ear pain,   NECK: No pain or stiffness  RESPIRATORY: +  cough, No SOB,  wheezing, hemoptysis  CARDIOVASCULAR: + chest pain, No palpitations  GASTROINTESTINAL: No abdominal pain. No nausea, vomiting, diarrhea or constipation.  GENITOURINARY: No dysuria, frequency or hematuria  NEUROLOGICAL: + dizziness, No headache,  SKIN: No itching, rashes,   All other review of systems is negative unless indicated above.

## 2024-07-11 NOTE — ED ADULT NURSE NOTE - OBJECTIVE STATEMENT
68 y/o male ambulatory Pt A &O x3 Pt c/o  generalized weakness with dizziness today, c/o chest pain at triage, low BP at triage. Pt denies nausea, vomiting, diarrhea, fever, cough, chills.

## 2024-07-11 NOTE — H&P ADULT - PROBLEM SELECTOR PLAN 6
Hx of HLD on Atorvastatin 80mg QD, Ezetimibe 10mg QD  - c/w home med Hx of DM on Ozempic 1mg QW  - Hold home med   - SSI, FS  - Diabetic diet  - f/u A1c

## 2024-07-11 NOTE — H&P ADULT - PROBLEM SELECTOR PLAN 4
Hx of CAD s/p 3 stents ( 2019). On Aspirin 81mg, Atorvastatin 80mg, Ranolazine 1000mg QD  - c/w home med K 3.3 in the ED  s/p 40meq  KCL  - monitor

## 2024-07-12 ENCOUNTER — RESULT REVIEW (OUTPATIENT)
Age: 69
End: 2024-07-12

## 2024-07-12 DIAGNOSIS — I24.9 ACUTE ISCHEMIC HEART DISEASE, UNSPECIFIED: ICD-10-CM

## 2024-07-12 DIAGNOSIS — Z75.8 OTHER PROBLEMS RELATED TO MEDICAL FACILITIES AND OTHER HEALTH CARE: ICD-10-CM

## 2024-07-12 LAB
A1C WITH ESTIMATED AVERAGE GLUCOSE RESULT: 5.4 % — SIGNIFICANT CHANGE UP (ref 4–5.6)
ALBUMIN SERPL ELPH-MCNC: 3.1 G/DL — LOW (ref 3.5–5)
ALP SERPL-CCNC: 79 U/L — SIGNIFICANT CHANGE UP (ref 40–120)
ALT FLD-CCNC: 35 U/L DA — SIGNIFICANT CHANGE UP (ref 10–60)
ANION GAP SERPL CALC-SCNC: 9 MMOL/L — SIGNIFICANT CHANGE UP (ref 5–17)
AST SERPL-CCNC: 34 U/L — SIGNIFICANT CHANGE UP (ref 10–40)
BILIRUB SERPL-MCNC: 0.4 MG/DL — SIGNIFICANT CHANGE UP (ref 0.2–1.2)
BUN SERPL-MCNC: 29 MG/DL — HIGH (ref 7–18)
CALCIUM SERPL-MCNC: 8.5 MG/DL — SIGNIFICANT CHANGE UP (ref 8.4–10.5)
CHLORIDE SERPL-SCNC: 104 MMOL/L — SIGNIFICANT CHANGE UP (ref 96–108)
CHOLEST SERPL-MCNC: 72 MG/DL — SIGNIFICANT CHANGE UP
CO2 SERPL-SCNC: 26 MMOL/L — SIGNIFICANT CHANGE UP (ref 22–31)
CREAT SERPL-MCNC: 2.04 MG/DL — HIGH (ref 0.5–1.3)
EGFR: 34 ML/MIN/1.73M2 — LOW
ESTIMATED AVERAGE GLUCOSE: 108 MG/DL — SIGNIFICANT CHANGE UP (ref 68–114)
GLUCOSE BLDC GLUCOMTR-MCNC: 113 MG/DL — HIGH (ref 70–99)
GLUCOSE BLDC GLUCOMTR-MCNC: 88 MG/DL — SIGNIFICANT CHANGE UP (ref 70–99)
GLUCOSE BLDC GLUCOMTR-MCNC: 88 MG/DL — SIGNIFICANT CHANGE UP (ref 70–99)
GLUCOSE BLDC GLUCOMTR-MCNC: 96 MG/DL — SIGNIFICANT CHANGE UP (ref 70–99)
GLUCOSE SERPL-MCNC: 81 MG/DL — SIGNIFICANT CHANGE UP (ref 70–99)
HCT VFR BLD CALC: 34.6 % — LOW (ref 39–50)
HDLC SERPL-MCNC: 26 MG/DL — LOW
HGB BLD-MCNC: 11.8 G/DL — LOW (ref 13–17)
LIPID PNL WITH DIRECT LDL SERPL: 14 MG/DL — SIGNIFICANT CHANGE UP
MAGNESIUM SERPL-MCNC: 1.7 MG/DL — SIGNIFICANT CHANGE UP (ref 1.6–2.6)
MCHC RBC-ENTMCNC: 30.7 PG — SIGNIFICANT CHANGE UP (ref 27–34)
MCHC RBC-ENTMCNC: 34.1 GM/DL — SIGNIFICANT CHANGE UP (ref 32–36)
MCV RBC AUTO: 90.1 FL — SIGNIFICANT CHANGE UP (ref 80–100)
NON HDL CHOLESTEROL: 46 MG/DL — SIGNIFICANT CHANGE UP
NRBC # BLD: 0 /100 WBCS — SIGNIFICANT CHANGE UP (ref 0–0)
PHOSPHATE SERPL-MCNC: 3.5 MG/DL — SIGNIFICANT CHANGE UP (ref 2.5–4.5)
PLATELET # BLD AUTO: 194 K/UL — SIGNIFICANT CHANGE UP (ref 150–400)
POTASSIUM SERPL-MCNC: 3 MMOL/L — LOW (ref 3.5–5.3)
POTASSIUM SERPL-SCNC: 3 MMOL/L — LOW (ref 3.5–5.3)
PROT SERPL-MCNC: 7 G/DL — SIGNIFICANT CHANGE UP (ref 6–8.3)
RBC # BLD: 3.84 M/UL — LOW (ref 4.2–5.8)
RBC # FLD: 12.3 % — SIGNIFICANT CHANGE UP (ref 10.3–14.5)
SODIUM SERPL-SCNC: 139 MMOL/L — SIGNIFICANT CHANGE UP (ref 135–145)
TRIGL SERPL-MCNC: 158 MG/DL — HIGH
WBC # BLD: 7.92 K/UL — SIGNIFICANT CHANGE UP (ref 3.8–10.5)
WBC # FLD AUTO: 7.92 K/UL — SIGNIFICANT CHANGE UP (ref 3.8–10.5)

## 2024-07-12 PROCEDURE — 70450 CT HEAD/BRAIN W/O DYE: CPT | Mod: 26

## 2024-07-12 RX ORDER — LABETALOL HYDROCHLORIDE 300 MG/1
100 TABLET ORAL
Refills: 0 | Status: DISCONTINUED | OUTPATIENT
Start: 2024-07-12 | End: 2024-07-14

## 2024-07-12 RX ORDER — POTASSIUM CHLORIDE 600 MG/1
40 TABLET, FILM COATED, EXTENDED RELEASE ORAL ONCE
Refills: 0 | Status: COMPLETED | OUTPATIENT
Start: 2024-07-12 | End: 2024-07-12

## 2024-07-12 RX ORDER — RANOLAZINE 500 MG/1
1000 TABLET, EXTENDED RELEASE ORAL
Refills: 0 | Status: DISCONTINUED | OUTPATIENT
Start: 2024-07-12 | End: 2024-07-14

## 2024-07-12 RX ORDER — DEXTROSE MONOHYDRATE AND SODIUM CHLORIDE 5; .3 G/100ML; G/100ML
1000 INJECTION, SOLUTION INTRAVENOUS
Refills: 0 | Status: DISCONTINUED | OUTPATIENT
Start: 2024-07-12 | End: 2024-07-13

## 2024-07-12 RX ORDER — PANTOPRAZOLE SODIUM 40 MG/10ML
40 INJECTION, POWDER, FOR SOLUTION INTRAVENOUS
Refills: 0 | Status: DISCONTINUED | OUTPATIENT
Start: 2024-07-12 | End: 2024-07-14

## 2024-07-12 RX ORDER — INSULIN GLARGINE 100 [IU]/ML
14 INJECTION, SOLUTION SUBCUTANEOUS AT BEDTIME
Refills: 0 | Status: DISCONTINUED | OUTPATIENT
Start: 2024-07-12 | End: 2024-07-12

## 2024-07-12 RX ORDER — MINERAL OIL, PETROLATUM, PHENYLEPHRINE HYDROCHLORIDE 140; 719; 2.5 MG/G; MG/G; MG/G
1 OINTMENT RECTAL; TOPICAL DAILY
Refills: 0 | Status: DISCONTINUED | OUTPATIENT
Start: 2024-07-12 | End: 2024-07-14

## 2024-07-12 RX ADMIN — LABETALOL HYDROCHLORIDE 200 MILLIGRAM(S): 300 TABLET ORAL at 06:25

## 2024-07-12 RX ADMIN — MINERAL OIL, PETROLATUM, PHENYLEPHRINE HYDROCHLORIDE 1 SUPPOSITORY(S): 140; 719; 2.5 OINTMENT RECTAL; TOPICAL at 12:22

## 2024-07-12 RX ADMIN — ASPIRIN 81 MILLIGRAM(S): 325 TABLET, FILM COATED ORAL at 12:22

## 2024-07-12 RX ADMIN — DEXTROSE MONOHYDRATE AND SODIUM CHLORIDE 80 MILLILITER(S): 5; .3 INJECTION, SOLUTION INTRAVENOUS at 18:02

## 2024-07-12 RX ADMIN — DEXTROSE MONOHYDRATE AND SODIUM CHLORIDE 80 MILLILITER(S): 5; .3 INJECTION, SOLUTION INTRAVENOUS at 06:36

## 2024-07-12 RX ADMIN — HEPARIN SODIUM 5000 UNIT(S): 50 INJECTION, SOLUTION INTRAVENOUS at 18:02

## 2024-07-12 RX ADMIN — RANOLAZINE 1000 MILLIGRAM(S): 500 TABLET, EXTENDED RELEASE ORAL at 18:02

## 2024-07-12 RX ADMIN — DEXTROSE MONOHYDRATE AND SODIUM CHLORIDE 80 MILLILITER(S): 5; .3 INJECTION, SOLUTION INTRAVENOUS at 21:20

## 2024-07-12 RX ADMIN — LABETALOL HYDROCHLORIDE 100 MILLIGRAM(S): 300 TABLET ORAL at 18:02

## 2024-07-12 RX ADMIN — TAMSULOSIN HYDROCHLORIDE 0.4 MILLIGRAM(S): 0.4 CAPSULE ORAL at 21:17

## 2024-07-12 RX ADMIN — HEPARIN SODIUM 5000 UNIT(S): 50 INJECTION, SOLUTION INTRAVENOUS at 06:25

## 2024-07-12 RX ADMIN — POTASSIUM CHLORIDE 40 MILLIEQUIVALENT(S): 600 TABLET, FILM COATED, EXTENDED RELEASE ORAL at 10:43

## 2024-07-12 RX ADMIN — Medication 2 TABLET(S): at 21:17

## 2024-07-12 RX ADMIN — ATORVASTATIN CALCIUM 80 MILLIGRAM(S): 20 TABLET, FILM COATED ORAL at 21:17

## 2024-07-12 NOTE — CONSULT NOTE ADULT - SUBJECTIVE AND OBJECTIVE BOX
[facility] : Progress Note :: [phone], (office [phone]),   Dr Hook / Dr Flor / Dr Donovan / Dr Cr / Dr Petra DUFFY / Dr Rowell / Dr Pitts / Dr Royce calderon  _____________________________________________________________________________________________  Patient is a 69y Male whom presented to the hospital with dizzyness, a sensation of the room is spinning. Associated LT sided chest pain radiating to the upper back. He has H/O HTN, DM2 CAD S/P STENTS IN 2019 AND CABG 5yrs prior.  In ED noted with elevated SCR of 2.8 improving to 2.0. troponins are normal. S/P  CTA abdomen with no aortic dissection noted. no hydronephrosis or renal lesions.  Unclear baseline SCR but per primary team has had a SCR of 1.8 in the past.     PAST MEDICAL & SURGICAL HISTORY:  DM (diabetes mellitus)      HTN (hypertension)      HLD (hyperlipidemia)      History of BPH      CAD (coronary artery disease)      CAD S/P percutaneous coronary angioplasty        No Known Allergies    Home Medications Reviewed  Hospital Medications:   MEDICATIONS  (STANDING):  aspirin  chewable 81 milliGRAM(s) Oral daily  atorvastatin 80 milliGRAM(s) Oral at bedtime  heparin   Injectable 5000 Unit(s) SubCutaneous every 12 hours  insulin lispro (ADMELOG) corrective regimen sliding scale   SubCutaneous three times a day before meals  insulin lispro (ADMELOG) corrective regimen sliding scale   SubCutaneous at bedtime  labetalol 100 milliGRAM(s) Oral two times a day  lactated ringers. 1000 milliLiter(s) (80 mL/Hr) IV Continuous <Continuous>  phenylephrine 0.25% Suppository 1 Suppository(s) Rectal daily  ranolazine 1000 milliGRAM(s) Oral two times a day  senna 2 Tablet(s) Oral at bedtime  tamsulosin 0.4 milliGRAM(s) Oral at bedtime    SOCIAL HISTORY:  Denies ETOh,Smoking,   FAMILY HISTORY:  No pertinent family history in first degree relatives          VITALS:  T(F): 97.9 (07-12-24 @ 11:13), Max: 98.8 (07-11-24 @ 16:29)  HR: 81 (07-12-24 @ 11:13)  BP: 121/71 (07-12-24 @ 11:13)  RR: 19 (07-12-24 @ 11:13)  SpO2: 98% (07-12-24 @ 11:13)  Wt(kg): --      PHYSICAL EXAM:  Constitutional: NAD  HEENT: anicteric sclera, oropharynx clear.  Neck: No JVD  Respiratory: CTAB, no wheezes, rales or rhonchi  Cardiovascular: S1, S2, RRR  Gastrointestinal: BS+, soft, NT/ND  Extremities: No peripheral edema  Neurological: A/O x 3, no focal deficits  : No CVA tenderness. No cannon.       LABS:  07-12    139  |  104  |  29<H>  ----------------------------<  81  3.0<L>   |  26  |  2.04<H>    Ca    8.5      12 Jul 2024 07:53  Phos  3.5     07-12  Mg     1.7     07-12    TPro  7.0  /  Alb  3.1<L>  /  TBili  0.4  /  DBili      /  AST  34  /  ALT  35  /  AlkPhos  79  07-12    Creatinine Trend: 2.04 <--, 2.80 <--                        11.8   7.92  )-----------( 194      ( 12 Jul 2024 07:53 )             34.6     Urine Studies:  Urinalysis Basic - ( 12 Jul 2024 07:53 )    Color:  / Appearance:  / SG:  / pH:   Gluc: 81 mg/dL / Ketone:   / Bili:  / Urobili:    Blood:  / Protein:  / Nitrite:    Leuk Esterase:  / RBC:  / WBC    Sq Epi:  / Non Sq Epi:  / Bacteria:       Sodium, Random Urine: 6 mmol/L (07-11 @ 19:20)  Creatinine, Random Urine: 250 mg/dL (07-11 @ 19:20)  Osmolality, Random Urine: 601 mos/kg (07-11 @ 19:20)    RADIOLOGY & ADDITIONAL STUDIES:

## 2024-07-12 NOTE — PROGRESS NOTE ADULT - PROBLEM SELECTOR PLAN 8
- Hx of HTN on Labetalol 200mg QD, Losartan 50mg QD   - Hold Losartan in the setting of RANDELL   - c/w Labetalol 200mg QD med with parameters.

## 2024-07-12 NOTE — PROGRESS NOTE ADULT - PROBLEM SELECTOR PLAN 5
- Hx of CAD s/p 3 stents ( 2019). On Aspirin 81mg, Atorvastatin 80mg, Ranolazine 1000mg QD  - c/w home med  - pt follow with Dr Cecy Guzman at [facility]

## 2024-07-12 NOTE — PROGRESS NOTE ADULT - PROBLEM SELECTOR PLAN 2
- p/w dizziness (room spinning sensation)  - Likely vertigo vs orthostatic hypotension  - EK BPM, NSR  - CTH with no acute findings, chronic microvascular changes  - able to ambulate to the bathroom without dizziness  - F/U Orthostatic BP  - Meclizine 12.5mg BID prn  - Dr Perkins consulted

## 2024-07-12 NOTE — PROGRESS NOTE ADULT - ASSESSMENT
70 yo M with PMHx of HTN, DM, BPH, CAD s/p 3 stents (2019) present with dizziness ( room spinning sensation), constant sharp left sided chest pain that radiates to his upper back, which started this morning at 8am. Admitted to telemetry for ACS r/o, Dizziness found to have RANDELL

## 2024-07-12 NOTE — CONSULT NOTE ADULT - SUBJECTIVE AND OBJECTIVE BOX
Date of Service  24 @ 11:27    CHIEF COMPLAINT:Patient is a 69y old  Male who presents with a chief complaint of Unstable angina .      HPI:  68 yo M with PMH of HTN, DM, BPH, CAD s/p 3 stents () s/p CABG   present with dizziness ( room spinning sensation), constant sharp left sided chest pain that radiates to his upper back, which started this morning at 8am . Accompanied with 2 days history of  dry cough. Patient reports that the dizziness and chest pain started while he  was on the bus this morning. Patient reports that he felt so dizzy that he fell to the ground. Patient denies any head trauma, LOC. Patient states the chest pain is worse with exertion but improves with rest. Patient denies history of similar symptoms, nausea, vomiting, abdominal pain, palpitations, shortness of breath,     In the ED  v/s; BP 84/54, HR 95, Temp 97.6, 96% RA   repeat /67  Labs WBC 10k. Trop 27.9, 20, K 3.3, Cr 2.8   VBG 7.34/57/28/31  EK BPM, NSR  CTA chest: neg   CTA abdomen & pelvis: 3.2cm gall stone in the underdistended gall bladder  s/p Aspirin 162    (2024 16:13)      PAST MEDICAL & SURGICAL HISTORY:  DM (diabetes mellitus)      HTN (hypertension)      HLD (hyperlipidemia)      History of BPH      CAD (coronary artery disease)      CAD S/P percutaneous coronary angioplasty,s/p CABG           MEDICATIONS  (STANDING):  aspirin  chewable 81 milliGRAM(s) Oral daily  atorvastatin 80 milliGRAM(s) Oral at bedtime  heparin   Injectable 5000 Unit(s) SubCutaneous every 12 hours  insulin lispro (ADMELOG) corrective regimen sliding scale   SubCutaneous at bedtime  insulin lispro (ADMELOG) corrective regimen sliding scale   SubCutaneous three times a day before meals  labetalol 200 milliGRAM(s) Oral daily  lactated ringers. 1000 milliLiter(s) (80 mL/Hr) IV Continuous <Continuous>  phenylephrine 0.25% Suppository 1 Suppository(s) Rectal daily  ranolazine 1000 milliGRAM(s) Oral daily  senna 2 Tablet(s) Oral at bedtime  tamsulosin 0.4 milliGRAM(s) Oral at bedtime    MEDICATIONS  (PRN):  meclizine 12.5 milliGRAM(s) Oral two times a day PRN Dizziness      FAMILY HISTORY:  No pertinent family history in first degree relatives        SOCIAL HISTORY:    [x ] Non-smoker    [ x] Alcohol-denies    Allergies    No Known Allergies    Intolerances    	    REVIEW OF SYSTEMS:  CONSTITUTIONAL: No fever, weight loss, or fatigue  EYES: No eye pain, visual disturbances, or discharge  ENT:  No difficulty hearing, tinnitus, vertigo; No sinus or throat pain  NECK: No pain or stiffness  RESPIRATORY: No cough, wheezing, chills or hemoptysis; No Shortness of Breath  CARDIOVASCULAR: No chest pain, palpitations, passing out,+ dizziness, No leg swelling  GASTROINTESTINAL: No abdominal or epigastric pain. No nausea, vomiting, or hematemesis; No diarrhea or constipation. No melena or hematochezia.  GENITOURINARY: No dysuria, frequency, hematuria, or incontinence  NEUROLOGICAL: No headaches, memory loss, loss of strength, numbness, or tremors  SKIN: No itching, burning, rashes, or lesions   LYMPH Nodes: No enlarged glands  ENDOCRINE: No heat or cold intolerance; No hair loss  MUSCULOSKELETAL: No joint pain or swelling; No muscle, back, or extremity pain  PSYCHIATRIC: No depression, anxiety, mood swings, or difficulty sleeping  HEME/LYMPH: No easy bruising, or bleeding gums  ALLERGY AND IMMUNOLOGIC: No hives or eczema	        PHYSICAL EXAM:  T(C): 36.6 (24 @ 11:13), Max: 37.1 (24 @ 12:47)  HR: 81 (24 @ 11:13) (72 - 81)  BP: 121/71 (24 @ 11:13) (121/71 - 155/83)  RR: 19 (24 @ 11:13) (16 - 19)  SpO2: 98% (24 @ 11:13) (96% - 100%)  Wt(kg): --  I&O's Summary      Appearance: Normal	  HEENT:   Normal oral mucosa, PERRL, EOMI	  Lymphatic: No lymphadenopathy  Cardiovascular: Normal S1 S2, No JVD, No murmurs, No edema  Respiratory: Lungs clear to auscultation	  Psychiatry: A & O x 3, Mood & affect appropriate  Gastrointestinal:  Soft, Non-tender, + BS	  Skin: No rashes, No ecchymoses, No cyanosis	  Neurologic: Non-focal  Extremities: Normal range of motion, No clubbing, cyanosis or edema  Vascular: Peripheral pulses palpable 2+ bilaterally      ECG:  	nsr,lvh    LABS:	 	          Troponin I, High Sensitivity Result: 20.1 ng/L (24 @ 13:45)  Troponin I, High Sensitivity Result: 27.9 ng/L (24 @ 10:50)                          11.8   7.92  )-----------( 194      ( 2024 07:53 )             34.6     07-12    139  |  104  |  29<H>  ----------------------------<  81  3.0<L>   |  26  |  2.04<H>    Ca    8.5      2024 07:53  Phos  3.5     07-  Mg     1.7         TPro  7.0  /  Alb  3.1<L>  /  TBili  0.4  /  DBili  x   /  AST  34  /  ALT  35  /  AlkPhos  79  07-12    proBNP:   Lipid Profile: Cholesterol 72  LDL --  HDL 26    ldl calc 14  Ratio --    < from: CT Head No Cont (24 @ 09:49) >  ACC: [id number] EXAM:  CT BRAIN   ORDERED BY: NEVA DUFFY     PROCEDURE DATE:  2024          INTERPRETATION:  .    CLINICAL INFORMATION: Dizziness.    TECHNIQUE: Multiple axial CT images of the head were obtained without   contrast. Sagittal and coronal reconstructed images were acquired from   the source data.    COMPARISON: No prior CT studies of the brain are available for comparison   at this institution.    FINDINGS: There is no acute intracranial hemorrhage, mass effect, shift  of the midline structures, herniation, extra-axial fluid collection, or   hydrocephalus.    There is diffuse cerebral volume loss with prominence of the sulci,   fissures, and cisternal spaces which is normal for the patient's age.   There is mild deep and periventricular white matter hypoattenuation   statistically compatible with microvascular changes given calcific   atherosclerotic disease of the intracranial arteries.    Scattered mucosal thickening is seen throughout the paranasal sinuses.   The bilateral tympanomastoid cavities are clear. The calvarium is intact.   The imaged orbits are unremarkable.    IMPRESSION: No acute intracranial hemorrhage, mass effect, or shift of   the midline structures.    Mild chronic white matter microvascular type changes.    --- End of Report ---            DILAN MASON MD; Attending Radiologist  This document has been electronically signed. 2024  9:55AM    < end of copied text >  < from: CT Angio Chest Aorta w/wo IV Cont (24 @ 12:56) >  ACC: [id number] EXAM:  CT ANGIO CHEST AORTA WAWIC   ORDERED BY: GERALD TELLEZ     ACC: [id number] EXAM:  CT ANGIO ABD PELV (W)AW IC   ORDERED BY: GERALD TELLEZ     PROCEDURE DATE:  2024          INTERPRETATION:  CLINICAL INFORMATION: Chest and abdominal pain. Clinical   suspicion for aortic dissection.    COMPARISON: None.    CONTRAST/COMPLICATIONS:  IV Contrast: Omnipaque 350 (accession [id number]), IV contrast documented   in unlinked concurrent exam (accession [id number])  90 cc administered   10   cc discarded  Oral Contrast: NONE  Complications: None reported at time of study completion    PROCEDURE:  CT Angiography of the Chest, Abdomen and Pelvis.  Precontrast imaging was performed through the chest followed by arterial   phase imaging of the chest, abdomen and pelvis.  Sagittal and coronal reformats were performed as well as 3D (MIP)   reconstructions.    FINDINGS:  CHEST:  LUNGS AND LARGE AIRWAYS: Patent central airways. No pulmonary   consolidation or infiltrate. Small left atelectasis.  PLEURA: No pleural effusion or pneumothorax.  VESSELS: No aortic dissection, intramural hematoma, or aneurysm. Aortic   and coronary artery calcifications are present. No suspicious filling   defect in the pulmonary arteries to suggestpulmonary embolism.  HEART: Heart size is normal. No pericardial effusion.  MEDIASTINUM AND COREEN: No lymphadenopathy.  CHEST WALL AND LOWER NECK: Bilateral gynecomastia.    ABDOMEN AND PELVIS:  LIVER: Within normal limits.  BILE DUCTS: Normal caliber.  GALLBLADDER: 3.2 cm gallstone in the underdistended gallbladder.  SPLEEN: Within normal limits.  PANCREAS: Within normal limits.  ADRENALS: Nonspecific mild adrenal thickening bilaterally.  KIDNEYS/URETERS: Within normal limits. No evidence for a ureteral   calculus. No hydronephrosis.    BLADDER: Underdistended.  REPRODUCTIVE ORGANS: The prostate is mildly enlarged.    BOWEL: No bowel obstruction or grossly thickened bowel wall. The   visualized appendix appears unremarkable. Colonic diverticulosis without   evidence of diverticulitis.  PERITONEUM/RETROPERITONEUM: Within normal limits.  VESSELS: No abdominal aortic dissection, or aneurysm. Vascular   calcifications are present. The celiac axis artery, SMA, ABRIL, bilateral   main renal arteries and bilateral iliac arteries are patent.  LYMPH NODES: No lymphadenopathy.  ABDOMINAL WALL: 2 fat-containing ventral hernias.  BONES: Degenerative spondylosis.    IMPRESSION:  No evidence for aortic dissection, or aneurysm.    3.2 cm gallstone in the underdistended gallbladder.    --- End of Report ---            LEVI SOLOMON MD; Attending Radiologist  This document has been electronically signed. 2024  1:43PM    < end of copied text >

## 2024-07-12 NOTE — PROGRESS NOTE ADULT - PROBLEM SELECTOR PLAN 1
- p/w constant chest pain that radiates to the back and improves with rest   - Home med: Ranolazine   - EK BPM, NSR  - CTA chest: neg   - CTA abdomen & pelvis: 3.2cm gall stone in the underdistended gall bladder  - Trop neg x 2  - continue tele monitoring  - c/w Aspirin 81mg, Atorvastatin  - F/U ECHO -- pending read  - Cardio following Dr Garcia

## 2024-07-12 NOTE — PROGRESS NOTE ADULT - PROBLEM SELECTOR PLAN 3
- p/w  SCr 2.2 on admission, as per pt, his last cr was 1.8 in 06/2024  - likely pre renal  - cr 2.04 today -- improving  - c/w IVF for now  - trend CMP  - nephro consulted Dr Hook

## 2024-07-12 NOTE — CONSULT NOTE ADULT - ASSESSMENT
Patient is a 69y Male whom presented to the hospital with dizzyness, a sensation of the room is spinning. Associated LT sided chest pain radiating to the upper back. He has H/O HTN, DM2 CAD S/P STENTS IN 2019 AND CABG 5yrs prior.  In ED noted with elevated SCR of 2.8 improving to 2.0. troponins are normal. S/P  CTA abdomen with no aortic dissection noted. no hydronephrosis or renal lesions.  Unclear baseline SCR but per primary team has had a SCR of 1.8 in the past.     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 S/P IV contrast   no obstruction on CT scan.   SCR improving with isotonic fluids.  urinalysis bland but for minimal proteinuria   agree with holding losartan  check protein/creat ratio  daily BMP to monitor for CHRISTIANO.  # unstable angina, per cardiology  # hypokalemia- repleted  HTN. BP  controlled 
68 yo M with PMH of HTN, DM, BPH, CAD s/p 3 stents (2019) s/p CABG  present with dizziness ( room spinning sensation), constant sharp left sided chest pain that radiates to his upper back, which started this morning at 8am,RANDELL .  1.Tele monitoring.  2.Neurology eval.  3.RANDELL-ivf.  4.CAD-asa,labetalol,statin,ranexa  5.DM-Insulin.  6.Orthostatic bp.  7.BPH-flomax.  8.Echoacrdiogram.  9.GI and DVT prophylaxis.

## 2024-07-12 NOTE — PROGRESS NOTE ADULT - ASSESSMENT
seen and examined  on bed comfortable denies  dizzyness   has mild lower chest abd pain  non radiating  when touches it hurts  vs stable    lungs hearta bd ok    lower chest mild tenderness   pt denies any hip lower wallace pain  but has nild neck pain but has good ROM  and no tenderness on c spine   no radiation of pain   labs noted hgb 11.8 creat 2.04   k 3.0    A/p dizzyness s/p fall since chest pain ( had tenderness)  and some neck pain   ice  tylenol   dizzyness  neurology   pt was following ent out pt   CT Head is neg   Neuro   ent pt has [facility] out pt

## 2024-07-13 ENCOUNTER — TRANSCRIPTION ENCOUNTER (OUTPATIENT)
Age: 69
End: 2024-07-13

## 2024-07-13 LAB
ALBUMIN SERPL ELPH-MCNC: 3.2 G/DL — LOW (ref 3.5–5)
ALP SERPL-CCNC: 76 U/L — SIGNIFICANT CHANGE UP (ref 40–120)
ALT FLD-CCNC: 32 U/L DA — SIGNIFICANT CHANGE UP (ref 10–60)
ANION GAP SERPL CALC-SCNC: 7 MMOL/L — SIGNIFICANT CHANGE UP (ref 5–17)
AST SERPL-CCNC: 25 U/L — SIGNIFICANT CHANGE UP (ref 10–40)
BASOPHILS # BLD AUTO: 0.01 K/UL — SIGNIFICANT CHANGE UP (ref 0–0.2)
BASOPHILS NFR BLD AUTO: 0.2 % — SIGNIFICANT CHANGE UP (ref 0–2)
BILIRUB SERPL-MCNC: 0.5 MG/DL — SIGNIFICANT CHANGE UP (ref 0.2–1.2)
BUN SERPL-MCNC: 20 MG/DL — HIGH (ref 7–18)
CALCIUM SERPL-MCNC: 8.2 MG/DL — LOW (ref 8.4–10.5)
CHLORIDE SERPL-SCNC: 106 MMOL/L — SIGNIFICANT CHANGE UP (ref 96–108)
CO2 SERPL-SCNC: 28 MMOL/L — SIGNIFICANT CHANGE UP (ref 22–31)
CREAT ?TM UR-MCNC: 88 MG/DL — SIGNIFICANT CHANGE UP
CREAT SERPL-MCNC: 1.51 MG/DL — HIGH (ref 0.5–1.3)
EGFR: 49 ML/MIN/1.73M2 — LOW
EOSINOPHIL # BLD AUTO: 0.17 K/UL — SIGNIFICANT CHANGE UP (ref 0–0.5)
EOSINOPHIL NFR BLD AUTO: 2.6 % — SIGNIFICANT CHANGE UP (ref 0–6)
GLUCOSE BLDC GLUCOMTR-MCNC: 101 MG/DL — HIGH (ref 70–99)
GLUCOSE BLDC GLUCOMTR-MCNC: 83 MG/DL — SIGNIFICANT CHANGE UP (ref 70–99)
GLUCOSE BLDC GLUCOMTR-MCNC: 84 MG/DL — SIGNIFICANT CHANGE UP (ref 70–99)
GLUCOSE BLDC GLUCOMTR-MCNC: 98 MG/DL — SIGNIFICANT CHANGE UP (ref 70–99)
GLUCOSE SERPL-MCNC: 78 MG/DL — SIGNIFICANT CHANGE UP (ref 70–99)
HCT VFR BLD CALC: 34.5 % — LOW (ref 39–50)
HGB BLD-MCNC: 11.8 G/DL — LOW (ref 13–17)
IMM GRANULOCYTES NFR BLD AUTO: 0.3 % — SIGNIFICANT CHANGE UP (ref 0–0.9)
LYMPHOCYTES # BLD AUTO: 2.13 K/UL — SIGNIFICANT CHANGE UP (ref 1–3.3)
LYMPHOCYTES # BLD AUTO: 33.1 % — SIGNIFICANT CHANGE UP (ref 13–44)
MAGNESIUM SERPL-MCNC: 1.6 MG/DL — SIGNIFICANT CHANGE UP (ref 1.6–2.6)
MCHC RBC-ENTMCNC: 30.8 PG — SIGNIFICANT CHANGE UP (ref 27–34)
MCHC RBC-ENTMCNC: 34.2 GM/DL — SIGNIFICANT CHANGE UP (ref 32–36)
MCV RBC AUTO: 90.1 FL — SIGNIFICANT CHANGE UP (ref 80–100)
MONOCYTES # BLD AUTO: 0.59 K/UL — SIGNIFICANT CHANGE UP (ref 0–0.9)
MONOCYTES NFR BLD AUTO: 9.2 % — SIGNIFICANT CHANGE UP (ref 2–14)
NEUTROPHILS # BLD AUTO: 3.51 K/UL — SIGNIFICANT CHANGE UP (ref 1.8–7.4)
NEUTROPHILS NFR BLD AUTO: 54.6 % — SIGNIFICANT CHANGE UP (ref 43–77)
NRBC # BLD: 0 /100 WBCS — SIGNIFICANT CHANGE UP (ref 0–0)
PHOSPHATE SERPL-MCNC: 3.3 MG/DL — SIGNIFICANT CHANGE UP (ref 2.5–4.5)
PLATELET # BLD AUTO: 184 K/UL — SIGNIFICANT CHANGE UP (ref 150–400)
POTASSIUM SERPL-MCNC: 3.1 MMOL/L — LOW (ref 3.5–5.3)
POTASSIUM SERPL-SCNC: 3.1 MMOL/L — LOW (ref 3.5–5.3)
PROT ?TM UR-MCNC: 23 MG/DL — HIGH (ref 0–12)
PROT SERPL-MCNC: 6.7 G/DL — SIGNIFICANT CHANGE UP (ref 6–8.3)
PROT/CREAT UR-RTO: 0.3 RATIO — HIGH (ref 0–0.2)
RBC # BLD: 3.83 M/UL — LOW (ref 4.2–5.8)
RBC # FLD: 12.2 % — SIGNIFICANT CHANGE UP (ref 10.3–14.5)
SODIUM SERPL-SCNC: 141 MMOL/L — SIGNIFICANT CHANGE UP (ref 135–145)
WBC # BLD: 6.43 K/UL — SIGNIFICANT CHANGE UP (ref 3.8–10.5)
WBC # FLD AUTO: 6.43 K/UL — SIGNIFICANT CHANGE UP (ref 3.8–10.5)

## 2024-07-13 PROCEDURE — 99223 1ST HOSP IP/OBS HIGH 75: CPT

## 2024-07-13 RX ORDER — SODIUM CHLORIDE 0.9 % (FLUSH) 0.9 %
1000 SYRINGE (ML) INJECTION
Refills: 0 | Status: DISCONTINUED | OUTPATIENT
Start: 2024-07-13 | End: 2024-07-14

## 2024-07-13 RX ORDER — POTASSIUM CHLORIDE 600 MG/1
40 TABLET, FILM COATED, EXTENDED RELEASE ORAL ONCE
Refills: 0 | Status: COMPLETED | OUTPATIENT
Start: 2024-07-13 | End: 2024-07-13

## 2024-07-13 RX ADMIN — LABETALOL HYDROCHLORIDE 100 MILLIGRAM(S): 300 TABLET ORAL at 17:45

## 2024-07-13 RX ADMIN — ASPIRIN 81 MILLIGRAM(S): 325 TABLET, FILM COATED ORAL at 13:02

## 2024-07-13 RX ADMIN — HEPARIN SODIUM 5000 UNIT(S): 50 INJECTION, SOLUTION INTRAVENOUS at 05:22

## 2024-07-13 RX ADMIN — TAMSULOSIN HYDROCHLORIDE 0.4 MILLIGRAM(S): 0.4 CAPSULE ORAL at 21:50

## 2024-07-13 RX ADMIN — PANTOPRAZOLE SODIUM 40 MILLIGRAM(S): 40 INJECTION, POWDER, FOR SOLUTION INTRAVENOUS at 05:26

## 2024-07-13 RX ADMIN — POTASSIUM CHLORIDE 40 MILLIEQUIVALENT(S): 600 TABLET, FILM COATED, EXTENDED RELEASE ORAL at 13:02

## 2024-07-13 RX ADMIN — MINERAL OIL, PETROLATUM, PHENYLEPHRINE HYDROCHLORIDE 1 SUPPOSITORY(S): 140; 719; 2.5 OINTMENT RECTAL; TOPICAL at 13:02

## 2024-07-13 RX ADMIN — ATORVASTATIN CALCIUM 80 MILLIGRAM(S): 20 TABLET, FILM COATED ORAL at 21:50

## 2024-07-13 RX ADMIN — RANOLAZINE 1000 MILLIGRAM(S): 500 TABLET, EXTENDED RELEASE ORAL at 17:45

## 2024-07-13 RX ADMIN — Medication 50 MILLILITER(S): at 11:30

## 2024-07-13 RX ADMIN — HEPARIN SODIUM 5000 UNIT(S): 50 INJECTION, SOLUTION INTRAVENOUS at 17:45

## 2024-07-13 RX ADMIN — RANOLAZINE 1000 MILLIGRAM(S): 500 TABLET, EXTENDED RELEASE ORAL at 05:23

## 2024-07-13 RX ADMIN — LABETALOL HYDROCHLORIDE 100 MILLIGRAM(S): 300 TABLET ORAL at 05:24

## 2024-07-13 RX ADMIN — Medication 2 TABLET(S): at 21:50

## 2024-07-13 NOTE — CONSULT NOTE ADULT - TIME BILLING
I counseled the patient, family at bedside, and primary team about the patient's likely diagnosis, as well as the conservative measures indicated to help prevent episodes of dizziness in the future.

## 2024-07-13 NOTE — DISCHARGE NOTE PROVIDER - NSDCCPCAREPLAN_GEN_ALL_CORE_FT
PRINCIPAL DISCHARGE DIAGNOSIS  Diagnosis: ACS (acute coronary syndrome)  Assessment and Plan of Treatment: You presented with left sided chest pain and dizziness. Your cardiac enzyme that indicates cardiac injury are negative. You were evaluated by a cardiologist. Your echocardiogram showed function of 66%.  Continue with aspirin and anticholesterol medication. continue taking ranexa, your home medication  Follow up with your cardiologist for further management      SECONDARY DISCHARGE DIAGNOSES  Diagnosis: Dizziness  Assessment and Plan of Treatment: You also presented with dizziness. Your CT head is negative for any acute findings. You were given meclizine. You were evaluated by a neurologist and recommended -------------------  Your symptoms is now resolved    Diagnosis: DM (diabetes mellitus)  Assessment and Plan of Treatment: HgA1C this admission 5.4  Make sure you get your HgA1c checked every three months.  If you have not seen your ophthalmologist this year call for appointment.  Check your feet daily for redness, sores, or openings. Do not self treat. If no improvement in two days call your primary care physician for an appointment.  Follow up with endocrinologist to establish long term goals for your A1c and for long term management and monitoring      Diagnosis: CAD (coronary artery disease)  Assessment and Plan of Treatment: Continue your home medications, Aspirin 81mg, Atorvastatin 80mg, Ranolazine 1000mg QD  Follow up with your cardiologist for long term management of this condition    Diagnosis: Hypokalemia  Assessment and Plan of Treatment: you were given potassium supplements and your potassium is now back to normal.   Low potassium symptoms may include:  Weakness  Fatigue  Muscle cramps  Constipation  Abnormal heart rhythms (arrhythmias) are the most worrisome complication of very low potassium levels  foods rich in potassium include: Bananas, oranges, cantaloupe, honeydew, apricots, grapefruit (some dried fruits, such as prunes, raisins, and dates, are also high in potassium)  Cooked spinach.  Cooked broccoli.  Potatoes.  Sweet potatoes.  Mushrooms.  Peas.  Cucumbers.      Diagnosis: RANDELL (acute kidney injury)  Assessment and Plan of Treatment: You were found to have elevated creatinine that can indicate poor kidney function. You were given IV fluids and your creatinine level is now back to your baseline. You were evaluated by a nephrologist who did not recommend any further workup  Manage RANDELL  Manage other health conditions such as diabetes, high blood pressure, or heart disease. These conditions increase your risk for acute kidney injury. Take your medicines for these conditions as directed. Also, monitor your blood sugar and blood pressure levels as directed.  NSAIDs, stomach medicine, or laxatives may harm your kidneys and increase your risk for acute kidney injury  Drink liquids to help your kidneys work better and decrease your risk for dehydration.      Diagnosis: Orthostatic hypotension  Assessment and Plan of Treatment: You have found to have a drop in blood pressure when standing. You were evaluated by a cardiologist and your blood pressure medications were adjusted. Follow up with primary care provider for further management  Move slowly and let yourself get used to one position before you move to another position. This is very important when you change from a lying or sitting position to a standing position. Take some deep breaths before you stand up from a lying position. Stand up slowly. Sudden movements may cause a fainting spell. Sit on the side of the bed or couch for a few minutes before you stand up. If you are on bedrest, try to be upright for about 2 hours each day, or as directed. Do not lock your legs if you are standing for a long period of time. Move your legs and bend your knees to keep blood flowing.    Diagnosis: HTN (hypertension)  Assessment and Plan of Treatment: Continue your home medications except Losartan. Your Losartan medication was held due acute kidney injury. Follow up with primary care provider when to resume and for long term management and monitoring of your blood pressure    Diagnosis: History of BPH  Assessment and Plan of Treatment:     Diagnosis: HLD (hyperlipidemia)  Assessment and Plan of Treatment: Continue taking your anticholesterol medication. Follow up with your primary care provider for long term management of your cholesterol     PRINCIPAL DISCHARGE DIAGNOSIS  Diagnosis: ACS (acute coronary syndrome)  Assessment and Plan of Treatment: You presented with left sided chest pain and dizziness. Your cardiac enzyme that indicates cardiac injury are negative. You were evaluated by a cardiologist. Your echocardiogram showed function of 66%.  Continue with aspirin and anticholesterol medication. continue taking ranexa, your home medication  Follow up with your cardiologist for further management      SECONDARY DISCHARGE DIAGNOSES  Diagnosis: Dizziness  Assessment and Plan of Treatment: You also presented with dizziness. Your CT head is negative for any acute findings. You were given meclizine. You were evaluated by a neurologist and recommended to continue with meclizine, Aspirin and Cholesterol medication.    Your symptoms is now resolved    Diagnosis: RANDELL (acute kidney injury)  Assessment and Plan of Treatment: You were found to have elevated creatinine that can indicate poor kidney function. You were given IV fluids and your creatinine level is now back to your baseline. You were evaluated by a nephrologist who did not recommend any further workup  Manage RANDELL  Manage other health conditions such as diabetes, high blood pressure, or heart disease. These conditions increase your risk for acute kidney injury. Take your medicines for these conditions as directed. Also, monitor your blood sugar and blood pressure levels as directed.  NSAIDs, stomach medicine, or laxatives may harm your kidneys and increase your risk for acute kidney injury  Drink liquids to help your kidneys work better and decrease your risk for dehydration.      Diagnosis: Hypokalemia  Assessment and Plan of Treatment: you were given potassium supplements and your potassium is now back to normal.   Low potassium symptoms may include:  Weakness  Fatigue  Muscle cramps  Constipation  Abnormal heart rhythms (arrhythmias) are the most worrisome complication of very low potassium levels  foods rich in potassium include: Bananas, oranges, cantaloupe, honeydew, apricots, grapefruit (some dried fruits, such as prunes, raisins, and dates, are also high in potassium)  Cooked spinach.  Cooked broccoli.  Potatoes.  Sweet potatoes.  Mushrooms.  Peas.  Cucumbers.      Diagnosis: CAD (coronary artery disease)  Assessment and Plan of Treatment: Continue your home medications, Aspirin 81mg, Atorvastatin 80mg, Ranolazine 1000mg QD  Follow up with your cardiologist for long term management of this condition    Diagnosis: DM (diabetes mellitus)  Assessment and Plan of Treatment: HgA1C this admission 5.4  Make sure you get your HgA1c checked every three months.  If you have not seen your ophthalmologist this year call for appointment.  Check your feet daily for redness, sores, or openings. Do not self treat. If no improvement in two days call your primary care physician for an appointment.  Follow up with endocrinologist to establish long term goals for your A1c and for long term management and monitoring      Diagnosis: Orthostatic hypotension  Assessment and Plan of Treatment: You have found to have a drop in blood pressure when standing. You were evaluated by a cardiologist and your blood pressure medications were adjusted. Follow up with primary care provider for further management  Move slowly and let yourself get used to one position before you move to another position. This is very important when you change from a lying or sitting position to a standing position. Take some deep breaths before you stand up from a lying position. Stand up slowly. Sudden movements may cause a fainting spell. Sit on the side of the bed or couch for a few minutes before you stand up. If you are on bedrest, try to be upright for about 2 hours each day, or as directed. Do not lock your legs if you are standing for a long period of time. Move your legs and bend your knees to keep blood flowing.    Diagnosis: HLD (hyperlipidemia)  Assessment and Plan of Treatment: Continue taking your anticholesterol medication. Follow up with your primary care provider for long term management of your cholesterol    Diagnosis: HTN (hypertension)  Assessment and Plan of Treatment: Continue your home medications except Losartan. Your Losartan medication was held due acute kidney injury. Follow up with primary care provider when to resume and for long term management and monitoring of your blood pressure    Diagnosis: History of BPH  Assessment and Plan of Treatment: You have an enlarged prostate gland which gets bigger as men get older - it is a very common problem and has nothing to do with prostate cancer  Call your doctor if you are urinating more frequently, have trouble starting to urinate, have weak stream, urine leaking or dribbling, and feeling as though bladder is not empty after urination  Your doctor will monitor your prostate with a rectal exam as well as urine or blood testing  You can help yourself by reducing the amount of fluid you drink before going to bed, limiting the amount of alcohol & caffeine you drink   Avoid cold & allergy medication that contain decongestants or antihistamines which make BPH symptoms worse  You can also "double void" by waiting a moment after urinating & trying again  Take your medication as prescribed - one medication helps to relax the muscle around the urethra and the other medication you may take prevents the prostate from growing more or even shrinking the prostate

## 2024-07-13 NOTE — PROGRESS NOTE ADULT - ASSESSMENT
70 yo M with PMH of HTN, DM, BPH, CAD s/p 3 stents (2019) s/p CABG  present with dizziness ( room spinning sensation), constant sharp left sided chest pain that radiates to his upper back, which started this morning at 8am,RANDELL .  1.Tele monitoring.  2.Neurology eval.  3.RANDELL-ivf.Renal f/u,replace k+.  4.CAD-asa,labetalol,statin,ranexa  5.DM-Insulin.  6.Orthostatic bp +.  7.BPH-flomax.  8.Echoacrdiogram.  9.GI and DVT prophylaxis.

## 2024-07-13 NOTE — PROGRESS NOTE ADULT - ASSESSMENT
Patient is a 69y Male whom presented to the hospital with dizzyness, a sensation of the room is spinning. Associated LT sided chest pain radiating to the upper back. He has H/O HTN, DM2 CAD S/P STENTS IN 2019 AND CABG 5yrs prior.  In ED noted with elevated SCR of 2.8 improving to 2.0. troponins are normal. S/P  CTA abdomen with no aortic dissection noted. no hydronephrosis or renal lesions.  Unclear baseline SCR but per primary team has had a SCR of 1.8 in the past.     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 S/P IV contrast   no obstruction on CT scan.   SCR improving with isotonic fluids.  urinalysis bland but for minimal proteinuria   agree with holding losartan  check protein/creat ratio  daily BMP to monitor for CHRISTIANO.  # unstable angina, per cardiology  # hypokalemia- repleted  HTN. BP  controlled       For any question, call:  Cell # [phone]  Pager # [phone]  Callback # [phone]

## 2024-07-13 NOTE — CONSULT NOTE ADULT - SUBJECTIVE AND OBJECTIVE BOX
NEUROLOGY CONSULT NOTE    NAME:  ARLENE MORALES      ASSESSMENT:  69 RHM with lightheadedness in the setting of chest pain, likely secondary to orthostatic hypotension, without clinical findings indicative of acute ischemic stroke or neurodegenerative disease      RECOMMENDATIONS:    - Maintain in Telemetry unit: Q4H VS & Neurochecks    - Monitor orthostatic BP & HR with each vital signs check    - May use Acetaminophen 650mg PO Q6H PRN headache    - Plentiful fluid hydration (2 liters, or 8 glasses, of water daily) encouraged    - Patient counseled to maintain caution with rapid changes in position    - Continue Meclizine 12.5mg PO BID PRN dizziness/vertigo    - Continue home Aspirin 81mg PO Daily and Atorvastatin 80mg PO QHS to manage cardiac and stroke risk factors    - Diabetes management as per primary team    - Follow up Cardiology consultation recommendations          NOTE TO BE COMPLETED - PLEASE REFER TO ABOVE ONLY AND IGNORE INFORMATION BELOW    *******************************      CHIEF COMPLAINT:  Patient is a 69y old  Male who presents with a chief complaint of Unstable angina (2024 16:48)      HPI:  68 yo M with PMH of HTN, DM, BPH, CAD s/p 3 stents (2019) s/p CABG   present with dizziness ( room spinning sensation), constant sharp left sided chest pain that radiates to his upper back, which started this morning at 8am . Accompanied with 2 days history of  dry cough. Patient reports that the dizziness and chest pain started while he  was on the bus this morning. Patient reports that he felt so dizzy that he fell to the ground. Patient denies any head trauma, LOC. Patient states the chest pain is worse with exertion but improves with rest. Patient denies history of similar symptoms, nausea, vomiting, abdominal pain, palpitations, shortness of breath,     In the ED  v/s; BP 84/54, HR 95, Temp 97.6, 96% RA   repeat /67  Labs WBC 10k. Trop 27.9, 20, K 3.3, Cr 2.8   VBG 7.34/57/28/31  EK BPM, NSR  CTA chest: neg   CTA abdomen & pelvis: 3.2cm gall stone in the underdistended gall bladder  s/p Aspirin 162    (2024 16:13)      NEURO HPI:      PAST MEDICAL & SURGICAL HISTORY:  DM (diabetes mellitus)  HTN (hypertension)  HLD (hyperlipidemia)  BPH  CAD S/P percutaneous coronary angioplasty      MEDICATIONS:  aspirin  chewable 81 milliGRAM(s) Oral daily  atorvastatin 80 milliGRAM(s) Oral at bedtime  heparin   Injectable 5000 Unit(s) SubCutaneous every 12 hours  insulin lispro (ADMELOG) corrective regimen sliding scale   SubCutaneous three times a day before meals  insulin lispro (ADMELOG) corrective regimen sliding scale   SubCutaneous at bedtime  labetalol 100 milliGRAM(s) Oral two times a day  meclizine 12.5 milliGRAM(s) Oral two times a day PRN  pantoprazole    Tablet 40 milliGRAM(s) Oral before breakfast  phenylephrine 0.25% Suppository 1 Suppository(s) Rectal daily  ranolazine 1000 milliGRAM(s) Oral two times a day  senna 2 Tablet(s) Oral at bedtime  sodium chloride 0.9%. 1000 milliLiter(s) IV Continuous <Continuous>  tamsulosin 0.4 milliGRAM(s) Oral at bedtime      ALLERGIES:  No Known Allergies      FAMILY HISTORY:  No pertinent family history in first degree relatives      SOCIAL HISTORY:  Denies alcohol, tobacco, or illicit drug use      REVIEW OF SYSTEMS:  GENERAL: No fever, weight changes, fatigue  EYES: No eye pain or discharge  EAR/NOSE/MOUTH/THROAT: No sinus or throat pain; No difficulty hearing  NECK: No pain or stiffness  RESPIRATORY: No cough, wheezing, chills, or hemoptysis  CARDIOVASCULAR: No chest pain, palpitations, shortness of breath, or dyspnea on exertion  GASTROINTESTINAL: No abdominal pain, nausea, vomiting, hematemesis, diarrhea, or constipation  GENITOURINARY: No dysuria, frequency, hematuria, or incontinence  SKIN: No rashes or lesions  ENDOCRINE: No heat or cold intolerance  HEMATOLOGIC: No easy bruising or bleeding  PSYCHIATRIC: No depression, anxiety, or mood swings  MUSCULOSKELETAL: No joint pain or swelling  NEUROLOGICAL: As per HPI          OBJECTIVE:    Vital Signs Last 24 Hrs  T(C): 36.6 (2024 19:28), Max: 37 (2024 08:20)  T(F): 97.9 (2024 19:28), Max: 98.6 (2024 08:20)  HR: 69 (2024 19:28) (68 - 88)  BP: 143/80 (2024 19:28) (106/58 - 147/79)  RR: 19 (2024 19:28) (18 - 19)  SpO2: 95% (2024 19:28) (94% - 99%)  Parameters below as of 2024 19:28  Patient On (Oxygen Delivery Method): room air      General Examination:  General: No acute distress  HEENT: Atraumatic, Normocephalic  Respiratory: CTA B/l.  No crackles, rhonchi, or wheezes.  Cardiovascular: RRR.  Normal S1 & S2.  Normal b/l radial and pedal pulses.    Neurological Examination:  General / Mental Status: AAO x 3.  No aphasia or dysarthria.  Naming and repetition intact.  Cranial Nerves: VFF x 4.  PERRL.  EOMI x 2, No nystagmus or diplopia.  B/l V1-V3 equal and intact to light touch and pinprick.  Symmetric facial movement and palate elevation.  B/l hearing equal to finger rub.  5/5 strength with b/l sternocleidomastoid and trapezius.  Midline tongue protrusion, with no atrophy or fasciculations.  Motor: Normal bulk & tone in all four extremities.  5/5 strength throughout all four extremities.  No downward drift, rigidity, spasticity, or tremors in any of the four extremities.  Sensory: Intact to light touch and pinprick in all four extremities.  Negative Romberg.  Reflex: 2+ and symmetric at b/l biceps, triceps, brachioradialis, patellae, and ankles.  Downgoing toes b/l.  Coordination: No dysmetria with b/l finger-to-nose and heel raise tests.  Symmetric rapid alternating movements b/l.  Gait: Normal, narrow-based gait.  No difficulty with tiptoe, heel, and tandem gaits.          LABORATORY VALUES:                        11.8   6.43  )-----------( 184      ( 2024 06:37 )             34.5       07-13    141  |  106  |  20<H>  ----------------------------<  78  3.1<L>   |  28  |  1.51<H>    Ca    8.2<L>      2024 06:37  Phos  3.3     07-13  Mg     1.6         TPro  6.7  /  Alb  3.2<L>  /  TBili  0.5  /  DBili  x   /  AST  25  /  ALT  32  /  AlkPhos  76   Chol 72 LDL 14 HDL 26<L> Trig 158<H>    A1C with Estimated Average Glucose (24 @ 07:53)   A1C with Estimated Average Glucose Result: 5.4%  Estimated Average Glucose: 108 mg/dL          NEUROIMAGING:          Please contact the Neurology consult service with any neurological questions.    Gonzalo Perkins MD   of Neurology  [facility] NEUROLOGY CONSULT NOTE    NAME:  ARLENE MORALES      ASSESSMENT:  69 RHM with lightheadedness in the setting of chest pain, likely secondary to orthostatic hypotension, without clinical findings indicative of acute ischemic stroke or neurodegenerative disease      RECOMMENDATIONS:    - Maintain in Telemetry unit: Q4H VS & Neurochecks    - Monitor orthostatic BP & HR with each vital signs check    - May use Acetaminophen 650mg PO Q6H PRN headache    - Plentiful fluid hydration (2 liters, or 8 glasses, of water daily) encouraged    - Patient counseled to maintain caution with rapid changes in position    - Continue Meclizine 12.5mg PO BID PRN dizziness/vertigo    - Continue home Aspirin 81mg PO Daily and Atorvastatin 80mg PO QHS to manage cardiac and stroke risk factors    - Diabetes management as per primary team    - Follow up Cardiology consultation recommendations          *******************************      CHIEF COMPLAINT:  Patient is a 69y old  Male who presents with a chief complaint of Unstable angina (2024 16:48)      HPI:  68 yo M with PMH of HTN, DM, BPH, CAD s/p 3 stents () s/p CABG   present with dizziness (room spinning sensation), constant sharp left sided chest pain that radiates to his upper back, which started this morning at 8am. Accompanied with 2 days history of dry cough. Patient reports that the dizziness and chest pain started while he  was on the bus this morning. Patient reports that he felt so dizzy that he fell to the ground. Patient denies any head trauma, LOC. Patient states the chest pain is worse with exertion but improves with rest. Patient denies history of similar symptoms, nausea, vomiting, abdominal pain, palpitations, shortness of breath,   In the ED  v/s; BP 84/54, HR 95, Temp 97.6, 96% RA   repeat /67  Labs WBC 10k. Trop 27.9, 20, K 3.3, Cr 2.8   VBG 7.34/57/28/31  EK BPM, NSR  CTA chest: neg   CTA abdomen & pelvis: 3.2cm gall stone in the underdistended gall bladder  s/p Aspirin 162  (2024 16:13)      NEURO HPI:  69 RHM who presented with dizziness, described as both lightheadedness and room-spinning sensation, in the setting of chest pain. He has been found to have orthostatic hypotension.      PAST MEDICAL & SURGICAL HISTORY:  DM (diabetes mellitus)  HTN (hypertension)  HLD (hyperlipidemia)  BPH  CAD S/P percutaneous coronary angioplasty      MEDICATIONS:  aspirin  chewable 81 milliGRAM(s) Oral daily  atorvastatin 80 milliGRAM(s) Oral at bedtime  heparin   Injectable 5000 Unit(s) SubCutaneous every 12 hours  insulin lispro (ADMELOG) corrective regimen sliding scale   SubCutaneous three times a day before meals  insulin lispro (ADMELOG) corrective regimen sliding scale   SubCutaneous at bedtime  labetalol 100 milliGRAM(s) Oral two times a day  meclizine 12.5 milliGRAM(s) Oral two times a day PRN  pantoprazole    Tablet 40 milliGRAM(s) Oral before breakfast  phenylephrine 0.25% Suppository 1 Suppository(s) Rectal daily  ranolazine 1000 milliGRAM(s) Oral two times a day  senna 2 Tablet(s) Oral at bedtime  sodium chloride 0.9%. 1000 milliLiter(s) IV Continuous <Continuous>  tamsulosin 0.4 milliGRAM(s) Oral at bedtime      ALLERGIES:  No Known Allergies      FAMILY HISTORY:  No pertinent family history in first degree relatives      SOCIAL HISTORY:  Denies alcohol, tobacco, or illicit drug use      REVIEW OF SYSTEMS:  GENERAL: No fever, weight changes, fatigue  EYES: No eye pain or discharge  EAR/NOSE/MOUTH/THROAT: No sinus or throat pain; No difficulty hearing  NECK: No pain or stiffness  RESPIRATORY: No cough, wheezing, chills, or hemoptysis  CARDIOVASCULAR: Recent left-sided chest pain as per HPI; No palpitations, shortness of breath, or dyspnea on exertion  GASTROINTESTINAL: No abdominal pain, nausea, vomiting, hematemesis, diarrhea, or constipation  GENITOURINARY: No dysuria, frequency, hematuria, or incontinence  SKIN: No rashes or lesions  ENDOCRINE: No heat or cold intolerance  HEMATOLOGIC: No easy bruising or bleeding  PSYCHIATRIC: No depression, anxiety, or mood swings  MUSCULOSKELETAL: No joint pain or swelling  NEUROLOGICAL: As per HPI          OBJECTIVE:    Vital Signs Last 24 Hrs  T(C): 36.6 (2024 19:28), Max: 37 (2024 08:20)  T(F): 97.9 (2024 19:28), Max: 98.6 (2024 08:20)  HR: 69 (2024 19:28) (68 - 88)  BP: 143/80 (2024 19:28) (106/58 - 147/79)  RR: 19 (2024 19:28) (18 - 19)  SpO2: 95% (2024 19:28) (94% - 99%)  Parameters below as of 2024 19:28  Patient On (Oxygen Delivery Method): room air      General Examination:  General: No acute distress  HEENT: Atraumatic, Normocephalic  Respiratory: CTA B/l.  No crackles, rhonchi, or wheezes.  Cardiovascular: RRR.  Normal S1 & S2.  Normal b/l radial and pedal pulses.    Neurological Examination:  General / Mental Status: AAO x 3.  No aphasia or dysarthria.  Naming and repetition intact.  Cranial Nerves: VFF x 4.  PERRL.  EOMI x 2, No nystagmus or diplopia.  B/l V1-V3 equal and intact to light touch and pinprick.  Symmetric facial movement and palate elevation.  B/l hearing equal to finger rub.  Negative [place]-Hallpike maneuver b/l.  5/5 strength with b/l sternocleidomastoid and trapezius.  Midline tongue protrusion, with no atrophy or fasciculations.  Motor: Normal bulk & tone in all four extremities.  5/5 strength throughout all four extremities.  No downward drift, rigidity, spasticity, or tremors in any of the four extremities.  Sensory: Intact to light touch and pinprick in all four extremities.  Negative Romberg.  Reflex: 2+ and symmetric at b/l biceps, triceps, brachioradialis, patellae, and ankles.  Downgoing toes b/l.  Coordination: No dysmetria with b/l finger-to-nose and heel raise tests.  Gait and Romberg sign testing deferred per patient preference.      NIHSS Score:    LOC - 0  LOC Questions - 0  LOC Commands - 0  Gaze Preference - 0  Visual Fields - 0  Facial Palsy - 0  Motor Arm Left - 0  Motor Arm Right - 0  Motor Leg Left - 0  Motor Leg Right - 0  Limb Ataxia - 0  Sensory - 0  Language - 0  Speech - 0  Extinction - 0    NIHSS Score Total: 0 - No IV TNK due to presentation outside the time window with NIHSS 0    Modified Nicollet Scale: 1          LABORATORY VALUES:                        11.8   6.43  )-----------( 184      ( 2024 06:37 )             34.5           141  |  106  |  20<H>  ----------------------------<  78  3.1<L>   |  28  |  1.51<H>    Ca    8.2<L>      2024 06:37  Phos  3.3       Mg     1.6         TPro  6.7  /  Alb  3.2<L>  /  TBili  0.5  /  DBili  x   /  AST  25  /  ALT  32  /  AlkPhos  76   Chol 72 LDL 14 HDL 26<L> Trig 158<H>    A1C with Estimated Average Glucose (24 @ 07:53)   A1C with Estimated Average Glucose Result: 5.4%  Estimated Average Glucose: 108 mg/dL          NEUROIMAGING:      CT Head (24):  - No acute intracranial abnormality  - Mild chronic microvascular changes  - Mild diffuse atrophy      Echo (24):  - LVEF 66%  - Grade 1 (mild) diastolic dysfunction  - Trace mitral, tricuspid, and pulmonic regurgitation          Please contact the Neurology consult service with any neurological questions.    Gonzalo Perkins MD   of Neurology  [facility]

## 2024-07-13 NOTE — DISCHARGE NOTE PROVIDER - NSFOLLOWUPCLINICS_GEN_ALL_ED_FT
Yif[facility] Handy Neu[facility]  Neurology  [address] [place] [address]  Phone: [phone]  Fax: [phone]  Follow Up Time: 2 weeks    Yifan Handy ENT  ENT  [address] [place] [address]  Phone: [phone]  Fax: [phone]  Follow Up Time: 2 weeks

## 2024-07-13 NOTE — DISCHARGE NOTE PROVIDER - NSDCFUADDINST_GEN_ALL_CORE_FT
Please make sure to  your medication sent to your pharmacy and we also made changes to your current blood pressure medication.  Stop taking the labetalol and you will now staking Toprol Xl.   Please make sure to  your medication sent to your pharmacy and we also made changes to your current blood pressure medication.  Stop taking the labetalol and you will now staking Toprol Xl.    Please make sure to follow up with your heart doctor/cardiologist and neurologist as outpatient for ongoing monitoring of your condition.

## 2024-07-13 NOTE — DISCHARGE NOTE PROVIDER - CARE PROVIDER_API CALL
Rosalia Rogers  Family Medicine  [address] [place] [address], [address] [place] [address]  Phone: [phone]  Fax: [phone]  Follow Up Time: 2 weeks    ASHWIN MACKEY  [address] [place] [address]  Phone: ([phone]  Fax: ()-  Follow Up Time: 2 weeks   Rosalia Rogers  Family Medicine  [address] [place] [address], [address] [address]  Phone: [phone]  Fax: [phone]  Follow Up Time: 2 weeks    ASHWIN MACKEY  [address] [place] [address]  Phone: ()-  Fax: ()-  Follow Up Time: 2 weeks    Royce Irizarry  Nephrology  [address] [place] [address]  Phone: [phone]  Fax: [phone]  Follow Up Time:

## 2024-07-13 NOTE — DISCHARGE NOTE PROVIDER - PROVIDER TOKENS
PROVIDER:[TOKEN:[[id number]],FOLLOWUP:[2 weeks]],PROVIDER:[TOKEN:[[id number]],FOLLOWUP:[2 weeks]] PROVIDER:[TOKEN:[[id number]],FOLLOWUP:[2 weeks]],PROVIDER:[TOKEN:[[id number]],FOLLOWUP:[2 weeks]],PROVIDER:[TOKEN:[[id number]]]

## 2024-07-13 NOTE — DISCHARGE NOTE PROVIDER - HOSPITAL COURSE
68 yo M with PMHx of HTN, DM, BPH, CAD s/p 3 stents (2019) s/p CABG   present with dizziness ( room spinning sensation), constant sharp left sided chest pain that radiates to his upper back. Accompanied with 2 days history of dry cough. Patient reports dizziness and chest pain that make him fell to the ground. Patient denies any head trauma, LOC. Patient states the chest pain is worse with exertion but improves with rest. Admitted to telemetry for ACS r/o, Dizziness, RANDELL    CTA with no acute findings, 3.2 cm gallstone in the underdistended gallbladder. CTH also with no acute findings, Mild chronic white matter microvascular type changes. Cardiology following. Neuro consulted for dizziness, pending recs ----------    As per pt, all symptoms were resolved    Pt is medically optimized for discharge, discussed with the attending  This is just a brief hospital course, please refer to the progress notes for detailed information     Mr. Le is a 70 yo M with PMHx of HTN, DM, BPH, CAD s/p 3 stents (2019) s/p CABG   present with dizziness ( room spinning sensation), constant sharp left sided chest pain that radiates to his upper back. Accompanied with 2 days history of dry cough. Patient reports dizziness and chest pain that make him fell to the ground. Patient denies any head trauma, LOC. Patient states the chest pain is worse with exertion but improves with rest. Admitted to telemetry for ACS r/o, Dizziness, RANDELL.    CTA with no acute findings, 3.2 cm gallstone in the underdistended gallbladder. CTH also with no acute findings, Mild chronic white matter microvascular type changes. Cardiology following. Neuro consulted for dizziness, pending recs Meclizine, ASA and statin.     As per pt, all symptoms were resolved and requesting to go home.  Discussed with primary Attending and cards.      Pt is medically optimized for discharge, discussed with the attending.  This is just a brief hospital course, please refer to the progress notes for detailed information.

## 2024-07-13 NOTE — DISCHARGE NOTE PROVIDER - NSDCMRMEDTOKEN_GEN_ALL_CORE_FT
show aspirin 81 mg oral tablet: 1 tab(s) orally once a day  atorvastatin 80 mg oral tablet: 1 tab(s) orally once a day  ezetimibe 10 mg oral tablet: 1 tab(s) orally once a day  labetalol 200 mg oral tablet: 1 tab(s) orally once a day  losartan 50 mg oral tablet: 1 tab(s) orally once a day  Ozempic 2 mg/1.5 mL (1 mg dose) subcutaneous solution: 1 application subcutaneous once a week  ranolazine 1000 mg oral granule, extended release: 1,000 milligram(s) orally once a day  senna (sennosides) 8.6 mg oral tablet: 1 tab(s) orally once a day  tamsulosin 0.4 mg oral capsule: 1 cap(s) orally once a day   aspirin 81 mg oral tablet: 1 tab(s) orally once a day  atorvastatin 80 mg oral tablet: 1 tab(s) orally once a day  ezetimibe 10 mg oral tablet: 1 tab(s) orally once a day  Ozempic 2 mg/1.5 mL (1 mg dose) subcutaneous solution: 1 application subcutaneous once a week  ranolazine 1000 mg oral granule, extended release: 1,000 milligram(s) orally once a day  senna (sennosides) 8.6 mg oral tablet: 1 tab(s) orally once a day  tamsulosin 0.4 mg oral capsule: 1 cap(s) orally once a day   aspirin 81 mg oral tablet: 1 tab(s) orally once a day  atorvastatin 80 mg oral tablet: 1 tab(s) orally once a day  ezetimibe 10 mg oral tablet: 1 tab(s) orally once a day  meclizine 12.5 mg oral tablet: 1 tab(s) orally 2 times a day as needed for Dizziness MDD: 2 dosages  metoprolol succinate 25 mg oral tablet, extended release: 1 tab(s) orally once a day  Ozempic 2 mg/1.5 mL (1 mg dose) subcutaneous solution: 1 application subcutaneous once a week  pantoprazole 40 mg oral delayed release tablet: 1 tab(s) orally once a day (before a meal)  phenylephrine 0.25% rectal suppository: 1 suppository(ies) rectal once a day  ranolazine 1000 mg oral granule, extended release: 1,000 milligram(s) orally once a day  senna (sennosides) 8.6 mg oral tablet: 1 tab(s) orally once a day  tamsulosin 0.4 mg oral capsule: 1 cap(s) orally once a day

## 2024-07-13 NOTE — PROGRESS NOTE ADULT - ASSESSMENT
seen and examined  vs stable afebrile on bed not in nay distress  occasionally dizzy  has orthostatic hypotension    dose of labetalol decreased  denies cp or sob  lower abd tenderness better   labs noted craet 1.54  ( as per pt baseline 1.8)  no nystagmus    lungs hearta bd ok   a/p orthostatisis  labetolol decrease to 100 bid  IVf    on flomax  also    d/w card   d/w nephro  d/w neuro   if cleared by neuro  and pt is not orthostatic  dc home in am  out pt ent  card  neuro  nephrology  seen and examined  vs stable afebrile on bed not in nay distress  occasionally dizzy  has orthostatic hypotension    dose of labetalol decreased  denies cp or sob  lower abd tenderness better   labs noted craet 1.54  ( as per pt baseline 1.8)  no nystagmus    lungs hearta bd ok   a/p orthostatisis  labetolol decrease to 100 bid  IVf    on flomax  also    d/w card   d/w nephro  d/w neuro   if cleared by neuro  and pt is not orthostatic  dc home in am  out pt ent  card  neuro  nephrology   pt was about 340 now 270  on ozempic   diet explained to him   orthostatisi may also be from loosing wt sec to ozempic

## 2024-07-14 VITALS
DIASTOLIC BLOOD PRESSURE: 79 MMHG | HEART RATE: 73 BPM | OXYGEN SATURATION: 97 % | RESPIRATION RATE: 18 BRPM | SYSTOLIC BLOOD PRESSURE: 123 MMHG

## 2024-07-14 LAB
ALBUMIN SERPL ELPH-MCNC: 3.3 G/DL — LOW (ref 3.5–5)
ALP SERPL-CCNC: 72 U/L — SIGNIFICANT CHANGE UP (ref 40–120)
ALT FLD-CCNC: 27 U/L DA — SIGNIFICANT CHANGE UP (ref 10–60)
ANION GAP SERPL CALC-SCNC: 6 MMOL/L — SIGNIFICANT CHANGE UP (ref 5–17)
AST SERPL-CCNC: 22 U/L — SIGNIFICANT CHANGE UP (ref 10–40)
BASOPHILS # BLD AUTO: 0.02 K/UL — SIGNIFICANT CHANGE UP (ref 0–0.2)
BASOPHILS NFR BLD AUTO: 0.3 % — SIGNIFICANT CHANGE UP (ref 0–2)
BILIRUB SERPL-MCNC: 0.5 MG/DL — SIGNIFICANT CHANGE UP (ref 0.2–1.2)
BUN SERPL-MCNC: 18 MG/DL — SIGNIFICANT CHANGE UP (ref 7–18)
CALCIUM SERPL-MCNC: 7.9 MG/DL — LOW (ref 8.4–10.5)
CHLORIDE SERPL-SCNC: 108 MMOL/L — SIGNIFICANT CHANGE UP (ref 96–108)
CO2 SERPL-SCNC: 27 MMOL/L — SIGNIFICANT CHANGE UP (ref 22–31)
CREAT SERPL-MCNC: 1.35 MG/DL — HIGH (ref 0.5–1.3)
EGFR: 56 ML/MIN/1.73M2 — LOW
EOSINOPHIL # BLD AUTO: 0.16 K/UL — SIGNIFICANT CHANGE UP (ref 0–0.5)
EOSINOPHIL NFR BLD AUTO: 2.4 % — SIGNIFICANT CHANGE UP (ref 0–6)
GLUCOSE BLDC GLUCOMTR-MCNC: 112 MG/DL — HIGH (ref 70–99)
GLUCOSE BLDC GLUCOMTR-MCNC: 84 MG/DL — SIGNIFICANT CHANGE UP (ref 70–99)
GLUCOSE SERPL-MCNC: 72 MG/DL — SIGNIFICANT CHANGE UP (ref 70–99)
HCT VFR BLD CALC: 34.5 % — LOW (ref 39–50)
HGB BLD-MCNC: 11.8 G/DL — LOW (ref 13–17)
IMM GRANULOCYTES NFR BLD AUTO: 0.1 % — SIGNIFICANT CHANGE UP (ref 0–0.9)
LYMPHOCYTES # BLD AUTO: 2.18 K/UL — SIGNIFICANT CHANGE UP (ref 1–3.3)
LYMPHOCYTES # BLD AUTO: 32.4 % — SIGNIFICANT CHANGE UP (ref 13–44)
MAGNESIUM SERPL-MCNC: 1.3 MG/DL — LOW (ref 1.6–2.6)
MCHC RBC-ENTMCNC: 30.8 PG — SIGNIFICANT CHANGE UP (ref 27–34)
MCHC RBC-ENTMCNC: 34.2 GM/DL — SIGNIFICANT CHANGE UP (ref 32–36)
MCV RBC AUTO: 90.1 FL — SIGNIFICANT CHANGE UP (ref 80–100)
MONOCYTES # BLD AUTO: 0.52 K/UL — SIGNIFICANT CHANGE UP (ref 0–0.9)
MONOCYTES NFR BLD AUTO: 7.7 % — SIGNIFICANT CHANGE UP (ref 2–14)
NEUTROPHILS # BLD AUTO: 3.84 K/UL — SIGNIFICANT CHANGE UP (ref 1.8–7.4)
NEUTROPHILS NFR BLD AUTO: 57.1 % — SIGNIFICANT CHANGE UP (ref 43–77)
NRBC # BLD: 0 /100 WBCS — SIGNIFICANT CHANGE UP (ref 0–0)
PHOSPHATE SERPL-MCNC: 2.6 MG/DL — SIGNIFICANT CHANGE UP (ref 2.5–4.5)
PLATELET # BLD AUTO: 193 K/UL — SIGNIFICANT CHANGE UP (ref 150–400)
POTASSIUM SERPL-MCNC: 3.2 MMOL/L — LOW (ref 3.5–5.3)
POTASSIUM SERPL-SCNC: 3.2 MMOL/L — LOW (ref 3.5–5.3)
PROT SERPL-MCNC: 6.8 G/DL — SIGNIFICANT CHANGE UP (ref 6–8.3)
RBC # BLD: 3.83 M/UL — LOW (ref 4.2–5.8)
RBC # FLD: 12.1 % — SIGNIFICANT CHANGE UP (ref 10.3–14.5)
SODIUM SERPL-SCNC: 141 MMOL/L — SIGNIFICANT CHANGE UP (ref 135–145)
WBC # BLD: 6.73 K/UL — SIGNIFICANT CHANGE UP (ref 3.8–10.5)
WBC # FLD AUTO: 6.73 K/UL — SIGNIFICANT CHANGE UP (ref 3.8–10.5)

## 2024-07-14 PROCEDURE — 85730 THROMBOPLASTIN TIME PARTIAL: CPT

## 2024-07-14 PROCEDURE — 96372 THER/PROPH/DIAG INJ SC/IM: CPT

## 2024-07-14 PROCEDURE — 36415 COLL VENOUS BLD VENIPUNCTURE: CPT

## 2024-07-14 PROCEDURE — 85027 COMPLETE CBC AUTOMATED: CPT

## 2024-07-14 PROCEDURE — 93005 ELECTROCARDIOGRAM TRACING: CPT

## 2024-07-14 PROCEDURE — 81001 URINALYSIS AUTO W/SCOPE: CPT

## 2024-07-14 PROCEDURE — 82330 ASSAY OF CALCIUM: CPT

## 2024-07-14 PROCEDURE — 84132 ASSAY OF SERUM POTASSIUM: CPT

## 2024-07-14 PROCEDURE — 86900 BLOOD TYPING SEROLOGIC ABO: CPT

## 2024-07-14 PROCEDURE — 84100 ASSAY OF PHOSPHORUS: CPT

## 2024-07-14 PROCEDURE — 84295 ASSAY OF SERUM SODIUM: CPT

## 2024-07-14 PROCEDURE — 86901 BLOOD TYPING SEROLOGIC RH(D): CPT

## 2024-07-14 PROCEDURE — 70450 CT HEAD/BRAIN W/O DYE: CPT | Mod: MC

## 2024-07-14 PROCEDURE — 82962 GLUCOSE BLOOD TEST: CPT

## 2024-07-14 PROCEDURE — 80053 COMPREHEN METABOLIC PANEL: CPT

## 2024-07-14 PROCEDURE — 84300 ASSAY OF URINE SODIUM: CPT

## 2024-07-14 PROCEDURE — 84484 ASSAY OF TROPONIN QUANT: CPT

## 2024-07-14 PROCEDURE — 80061 LIPID PANEL: CPT

## 2024-07-14 PROCEDURE — 99291 CRITICAL CARE FIRST HOUR: CPT | Mod: 25

## 2024-07-14 PROCEDURE — 83605 ASSAY OF LACTIC ACID: CPT

## 2024-07-14 PROCEDURE — 82803 BLOOD GASES ANY COMBINATION: CPT

## 2024-07-14 PROCEDURE — 83735 ASSAY OF MAGNESIUM: CPT

## 2024-07-14 PROCEDURE — 82570 ASSAY OF URINE CREATININE: CPT

## 2024-07-14 PROCEDURE — 85610 PROTHROMBIN TIME: CPT

## 2024-07-14 PROCEDURE — 84156 ASSAY OF PROTEIN URINE: CPT

## 2024-07-14 PROCEDURE — 93306 TTE W/DOPPLER COMPLETE: CPT

## 2024-07-14 PROCEDURE — 85025 COMPLETE CBC W/AUTO DIFF WBC: CPT

## 2024-07-14 PROCEDURE — 74174 CTA ABD&PLVS W/CONTRAST: CPT | Mod: MC

## 2024-07-14 PROCEDURE — 86850 RBC ANTIBODY SCREEN: CPT

## 2024-07-14 PROCEDURE — 83935 ASSAY OF URINE OSMOLALITY: CPT

## 2024-07-14 PROCEDURE — 71275 CT ANGIOGRAPHY CHEST: CPT | Mod: MC

## 2024-07-14 PROCEDURE — 83036 HEMOGLOBIN GLYCOSYLATED A1C: CPT

## 2024-07-14 RX ORDER — MECLIZINE HCL 25 MG
1 TABLET ORAL
Qty: 28 | Refills: 0
Start: 2024-07-14 | End: 2024-07-27

## 2024-07-14 RX ORDER — MINERAL OIL, PETROLATUM, PHENYLEPHRINE HYDROCHLORIDE 140; 719; 2.5 MG/G; MG/G; MG/G
1 OINTMENT RECTAL; TOPICAL
Qty: 5 | Refills: 0
Start: 2024-07-14 | End: 2024-07-18

## 2024-07-14 RX ORDER — METOPROLOL TARTRATE 50 MG
1 TABLET ORAL
Qty: 30 | Refills: 0
Start: 2024-07-14 | End: 2024-08-12

## 2024-07-14 RX ORDER — PANTOPRAZOLE SODIUM 40 MG/10ML
1 INJECTION, POWDER, FOR SOLUTION INTRAVENOUS
Qty: 30 | Refills: 0
Start: 2024-07-14 | End: 2024-08-12

## 2024-07-14 RX ORDER — POTASSIUM CHLORIDE 600 MG/1
40 TABLET, FILM COATED, EXTENDED RELEASE ORAL ONCE
Refills: 0 | Status: COMPLETED | OUTPATIENT
Start: 2024-07-14 | End: 2024-07-14

## 2024-07-14 RX ORDER — METOPROLOL TARTRATE 50 MG
25 TABLET ORAL DAILY
Refills: 0 | Status: DISCONTINUED | OUTPATIENT
Start: 2024-07-14 | End: 2024-07-14

## 2024-07-14 RX ORDER — MAGNESIUM SULFATE 100 %
1 POWDER (GRAM) MISCELLANEOUS ONCE
Refills: 0 | Status: COMPLETED | OUTPATIENT
Start: 2024-07-14 | End: 2024-07-14

## 2024-07-14 RX ADMIN — ASPIRIN 81 MILLIGRAM(S): 325 TABLET, FILM COATED ORAL at 11:28

## 2024-07-14 RX ADMIN — HEPARIN SODIUM 5000 UNIT(S): 50 INJECTION, SOLUTION INTRAVENOUS at 05:37

## 2024-07-14 RX ADMIN — Medication 100 GRAM(S): at 09:39

## 2024-07-14 RX ADMIN — MINERAL OIL, PETROLATUM, PHENYLEPHRINE HYDROCHLORIDE 1 SUPPOSITORY(S): 140; 719; 2.5 OINTMENT RECTAL; TOPICAL at 11:27

## 2024-07-14 RX ADMIN — Medication 50 MILLILITER(S): at 05:53

## 2024-07-14 RX ADMIN — POTASSIUM CHLORIDE 40 MILLIEQUIVALENT(S): 600 TABLET, FILM COATED, EXTENDED RELEASE ORAL at 11:27

## 2024-07-14 RX ADMIN — RANOLAZINE 1000 MILLIGRAM(S): 500 TABLET, EXTENDED RELEASE ORAL at 05:37

## 2024-07-14 RX ADMIN — PANTOPRAZOLE SODIUM 40 MILLIGRAM(S): 40 INJECTION, POWDER, FOR SOLUTION INTRAVENOUS at 05:37

## 2024-07-14 RX ADMIN — LABETALOL HYDROCHLORIDE 100 MILLIGRAM(S): 300 TABLET ORAL at 05:37

## 2024-07-14 NOTE — PROGRESS NOTE ADULT - ASSESSMENT
68 yo M with PMH of HTN, DM, BPH, CAD s/p 3 stents (2019) s/p CABG  present with dizziness ( room spinning sensation), constant sharp left sided chest pain that radiates to his upper back, which started this morning at 8am,RANDELL,Orthostatic hypotension .  1.D/C Tele monitoring.  2.Neurology eval noted  3.RANDELL-ivf.Renal f/u,replace k+.  4.CAD-asa,d/c labetalol,start toprol xl 25mg qd, cont statin,ranexa  5.DM-Insulin.  6.Orthostatic bp +.  7.BPH-flomax.  8.Echoacrdiogram as above.  9.GI and DVT prophylaxis.  10.Pt wants to have stress test as outpatient.

## 2024-07-14 NOTE — PROGRESS NOTE ADULT - ASSESSMENT
Patient is a 69y Male whom presented to the hospital with dizzyness, a sensation of the room is spinning. Associated LT sided chest pain radiating to the upper back. He has H/O HTN, DM2 CAD S/P STENTS IN 2019 AND CABG 5yrs prior.  In ED noted with elevated SCR of 2.8 improving to 2.0. troponins are normal. S/P  CTA abdomen with no aortic dissection noted. no hydronephrosis or renal lesions.  Unclear baseline SCR but per primary team has had a SCR of 1.8 in the past.     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 S/P IV contrast   no obstruction on CT scan.   SCR improving with isotonic fluids.  urinalysis bland but for minimal proteinuria   agree with holding losartan  protein/creat ratio 300mg  daily BMP to monitor for CHRISTIANO.  # unstable angina, per cardiology  # hypokalemia- repleted  HTN. BP  controlled       For any question, call:  Cell # [phone]  Pager # [phone]  Callback # [phone]

## 2024-07-14 NOTE — DISCHARGE NOTE NURSING/CASE MANAGEMENT/SOCIAL WORK - PATIENT PORTAL LINK FT
You can access the FollowMyHealth Patient Portal offered by [facility] by registering at the following website: [email or web address]. By joining [employer]’s FollowMyHealth portal, you will also be able to view your health information using other applications (apps) compatible with our system.

## 2024-07-14 NOTE — PROGRESS NOTE ADULT - SUBJECTIVE AND OBJECTIVE BOX
HPI:  70 yo M with PMH of HTN, DM, BPH, CAD s/p 3 stents (2019) s/p CABG   present with dizziness ( room spinning sensation), constant sharp left sided chest pain that radiates to his upper back, which started this morning at 8am . Accompanied with 2 days history of  dry cough. Patient reports that the dizziness and chest pain started while he  was on the bus this morning. Patient reports that he felt so dizzy that he fell to the ground. Patient denies any head trauma, LOC. Patient states the chest pain is worse with exertion but improves with rest. Patient denies history of similar symptoms, nausea, vomiting, abdominal pain, palpitations, shortness of breath,     In the ED  v/s; BP 84/54, HR 95, Temp 97.6, 96% RA   repeat /67  Labs WBC 10k. Trop 27.9, 20, K 3.3, Cr 2.8   VBG 7.34/57/28/31  EK BPM, NSR  CTA chest: neg   CTA abdomen & pelvis: 3.2cm gall stone in the underdistended gall bladder  s/p Aspirin 162    (2024 16:13)      Patient is a 69y old  Male who presents with a chief complaint of Unstable angina (2024 12:53)      INTERVAL HPI/OVERNIGHT EVENTS:  T(C): 36.6 (24 @ 11:13), Max: 37.1 (24 @ 16:29)  HR: 81 (24 @ 11:13) (72 - 81)  BP: 121/71 (24 @ 11:13) (121/71 - 155/83)  RR: 19 (24 @ 11:13) (18 - 19)  SpO2: 98% (24 @ 11:13) (96% - 100%)  Wt(kg): --  I&O's Summary      REVIEW OF SYSTEMS: denies fever, chills, SOB, palpitations, chest pain, abdominal pain, nausea, vomitting, diarrhea, constipation, dizziness    MEDICATIONS  (STANDING):  aspirin  chewable 81 milliGRAM(s) Oral daily  atorvastatin 80 milliGRAM(s) Oral at bedtime  heparin   Injectable 5000 Unit(s) SubCutaneous every 12 hours  insulin lispro (ADMELOG) corrective regimen sliding scale   SubCutaneous at bedtime  insulin lispro (ADMELOG) corrective regimen sliding scale   SubCutaneous three times a day before meals  labetalol 100 milliGRAM(s) Oral two times a day  lactated ringers. 1000 milliLiter(s) (80 mL/Hr) IV Continuous <Continuous>  phenylephrine 0.25% Suppository 1 Suppository(s) Rectal daily  ranolazine 1000 milliGRAM(s) Oral two times a day  senna 2 Tablet(s) Oral at bedtime  tamsulosin 0.4 milliGRAM(s) Oral at bedtime    MEDICATIONS  (PRN):  meclizine 12.5 milliGRAM(s) Oral two times a day PRN Dizziness      PHYSICAL EXAM:  GENERAL: NAD, well-groomed, well-developed  HEAD:  Atraumatic, Normocephalic  EYES: EOMI, PERRLA, conjunctiva and sclera clear  ENMT: No tonsillar erythema, exudates, or enlargement; Moist mucous membranes, Good dentition, No lesions  NECK: Supple, No JVD, Normal thyroid  NERVOUS SYSTEM:  Alert & Oriented X3, Good concentration; Motor Strength 5/5 B/L upper and lower extremities; DTRs 2+ intact and symmetric  CHEST/LUNG: Clear to percussion bilaterally; No rales, rhonchi, wheezing, or rubs  HEART: Regular rate and rhythm; No murmurs, rubs, or gallops  ABDOMEN: Soft, Nontender, Nondistended; Bowel sounds present  EXTREMITIES:  2+ Peripheral Pulses, No clubbing, cyanosis, or edema  LYMPH: No lymphadenopathy noted  SKIN: No rashes or lesions  LABS:                        11.8   7.92  )-----------( 194      ( 2024 07:53 )             34.6     07-12    139  |  104  |  29<H>  ----------------------------<  81  3.0<L>   |  26  |  2.04<H>    Ca    8.5      2024 07:53  Phos  3.5     07-12  Mg     1.7     -    TPro  7.0  /  Alb  3.1<L>  /  TBili  0.4  /  DBili  x   /  AST  34  /  ALT  35  /  AlkPhos  79  07-12    PT/INR - ( 2024 10:50 )   PT: 11.9 sec;   INR: 1.05 ratio         PTT - ( 2024 10:50 )  PTT:31.6 sec  Urinalysis Basic - ( 2024 07:53 )    Color: x / Appearance: x / SG: x / pH: x  Gluc: 81 mg/dL / Ketone: x  / Bili: x / Urobili: x   Blood: x / Protein: x / Nitrite: x   Leuk Esterase: x / RBC: x / WBC x   Sq Epi: x / Non Sq Epi: x / Bacteria: x      CAPILLARY BLOOD GLUCOSE      POCT Blood Glucose.: 113 mg/dL (2024 11:21)  POCT Blood Glucose.: 96 mg/dL (2024 07:48)  POCT Blood Glucose.: 95 mg/dL (2024 22:39)        Urinalysis Basic - ( 2024 07:53 )    Color: x / Appearance: x / SG: x / pH: x  Gluc: 81 mg/dL / Ketone: x  / Bili: x / Urobili: x   Blood: x / Protein: x / Nitrite: x   Leuk Esterase: x / RBC: x / WBC x   Sq Epi: x / Non Sq Epi: x / Bacteria: x    
HPI:  70 yo M with PMH of HTN, DM, BPH, CAD s/p 3 stents (2019) s/p CABG   present with dizziness ( room spinning sensation), constant sharp left sided chest pain that radiates to his upper back, which started this morning at 8am . Accompanied with 2 days history of  dry cough. Patient reports that the dizziness and chest pain started while he  was on the bus this morning. Patient reports that he felt so dizzy that he fell to the ground. Patient denies any head trauma, LOC. Patient states the chest pain is worse with exertion but improves with rest. Patient denies history of similar symptoms, nausea, vomiting, abdominal pain, palpitations, shortness of breath,     In the ED  v/s; BP 84/54, HR 95, Temp 97.6, 96% RA   repeat /67  Labs WBC 10k. Trop 27.9, 20, K 3.3, Cr 2.8   VBG 7.34/57/28/31  EK BPM, NSR  CTA chest: neg   CTA abdomen & pelvis: 3.2cm gall stone in the underdistended gall bladder  s/p Aspirin 162    (2024 16:13)      Patient is a 69y old  Male who presents with a chief complaint of Unstable angina (2024 09:58)      INTERVAL HPI/OVERNIGHT EVENTS:  T(C): 36.9 (24 @ 11:20), Max: 37 (24 @ 08:20)  HR: 74 (24 @ 11:20) (72 - 88)  BP: 133/66 (24 @ 11:20) (106/58 - 147/79)  RR: 18 (24 @ 11:20) (18 - 19)  SpO2: 98% (24 @ 11:20) (94% - 99%)  Wt(kg): --  I&O's Summary      REVIEW OF SYSTEMS: denies fever, chills, SOB, palpitations, chest pain, abdominal pain, nausea, vomitting, diarrhea, constipation, dizziness    MEDICATIONS  (STANDING):  aspirin  chewable 81 milliGRAM(s) Oral daily  atorvastatin 80 milliGRAM(s) Oral at bedtime  heparin   Injectable 5000 Unit(s) SubCutaneous every 12 hours  insulin lispro (ADMELOG) corrective regimen sliding scale   SubCutaneous three times a day before meals  insulin lispro (ADMELOG) corrective regimen sliding scale   SubCutaneous at bedtime  labetalol 100 milliGRAM(s) Oral two times a day  pantoprazole    Tablet 40 milliGRAM(s) Oral before breakfast  phenylephrine 0.25% Suppository 1 Suppository(s) Rectal daily  ranolazine 1000 milliGRAM(s) Oral two times a day  senna 2 Tablet(s) Oral at bedtime  sodium chloride 0.9%. 1000 milliLiter(s) (50 mL/Hr) IV Continuous <Continuous>  tamsulosin 0.4 milliGRAM(s) Oral at bedtime    MEDICATIONS  (PRN):  meclizine 12.5 milliGRAM(s) Oral two times a day PRN Dizziness      PHYSICAL EXAM:  GENERAL: NAD, well-groomed, well-developed  HEAD:  Atraumatic, Normocephalic  EYES: EOMI, PERRLA, conjunctiva and sclera clear  ENMT: No tonsillar erythema, exudates, or enlargement; Moist mucous membranes, Good dentition, No lesions  NECK: Supple, No JVD, Normal thyroid  NERVOUS SYSTEM:  Alert & Oriented X3, Good concentration; Motor Strength 5/5 B/L upper and lower extremities; DTRs 2+ intact and symmetric  CHEST/LUNG: Clear to percussion bilaterally; No rales, rhonchi, wheezing, or rubs  HEART: Regular rate and rhythm; No murmurs, rubs, or gallops  ABDOMEN: Soft, Nontender, Nondistended; Bowel sounds present  EXTREMITIES:  2+ Peripheral Pulses, No clubbing, cyanosis, or edema  LYMPH: No lymphadenopathy noted  SKIN: No rashes or lesions  LABS:                        11.8   6.43  )-----------( 184      ( 2024 06:37 )             34.5     07-13    141  |  106  |  20<H>  ----------------------------<  78  3.1<L>   |  28  |  1.51<H>    Ca    8.2<L>      2024 06:37  Phos  3.3     07-13  Mg     1.6     07-13    TPro  6.7  /  Alb  3.2<L>  /  TBili  0.5  /  DBili  x   /  AST  25  /  ALT  32  /  AlkPhos  76  07-13      Urinalysis Basic - ( 2024 06:37 )    Color: x / Appearance: x / SG: x / pH: x  Gluc: 78 mg/dL / Ketone: x  / Bili: x / Urobili: x   Blood: x / Protein: x / Nitrite: x   Leuk Esterase: x / RBC: x / WBC x   Sq Epi: x / Non Sq Epi: x / Bacteria: x      CAPILLARY BLOOD GLUCOSE      POCT Blood Glucose.: 101 mg/dL (2024 11:49)  POCT Blood Glucose.: 98 mg/dL (2024 08:11)  POCT Blood Glucose.: 88 mg/dL (2024 20:55)  POCT Blood Glucose.: 88 mg/dL (2024 16:46)        Urinalysis Basic - ( 2024 06:37 )    Color: x / Appearance: x / SG: x / pH: x  Gluc: 78 mg/dL / Ketone: x  / Bili: x / Urobili: x   Blood: x / Protein: x / Nitrite: x   Leuk Esterase: x / RBC: x / WBC x   Sq Epi: x / Non Sq Epi: x / Bacteria: x    
[facility] - KIRILL Pitts / KIRILL Mccann / ZAKIYA Rowell/ KIRILL Lambert/ KIRILL Donovan/ KIMBERLY Flor / LIVAN Hook / EMETERIO Irizarry / TERESA Esteban  ---------------------------------------------------------------------------------------------------------------  seen and examined today for RANDELL  Interval : serum creatinine improved  VITALS:  T(F): 98.4 (07-13-24 @ 11:20), Max: 98.6 (07-13-24 @ 08:20)  HR: 74 (07-13-24 @ 11:20)  BP: 133/66 (07-13-24 @ 11:20)  RR: 18 (07-13-24 @ 11:20)  SpO2: 98% (07-13-24 @ 11:20)  Wt(kg): --    Physical Exam :-  Constitutional: NAD  Neck: Supple.  Respiratory: Bilateral equal breath sounds,  Cardiovascular: S1, S2 normal,  Gastrointestinal: Bowel Sounds present, soft, non tender.  Extremities: No edema  Neurological: Alert and Oriented x 3, no focal deficits  Psychiatric: Normal mood, normal affect  Data:-  Allergies :   No Known Allergies    Hospital Medications:   MEDICATIONS  (STANDING):  aspirin  chewable 81 milliGRAM(s) Oral daily  atorvastatin 80 milliGRAM(s) Oral at bedtime  heparin   Injectable 5000 Unit(s) SubCutaneous every 12 hours  insulin lispro (ADMELOG) corrective regimen sliding scale   SubCutaneous three times a day before meals  insulin lispro (ADMELOG) corrective regimen sliding scale   SubCutaneous at bedtime  labetalol 100 milliGRAM(s) Oral two times a day  pantoprazole    Tablet 40 milliGRAM(s) Oral before breakfast  phenylephrine 0.25% Suppository 1 Suppository(s) Rectal daily  ranolazine 1000 milliGRAM(s) Oral two times a day  senna 2 Tablet(s) Oral at bedtime  sodium chloride 0.9%. 1000 milliLiter(s) (50 mL/Hr) IV Continuous <Continuous>  tamsulosin 0.4 milliGRAM(s) Oral at bedtime    07-13    141  |  106  |  20<H>  ----------------------------<  78  3.1<L>   |  28  |  1.51<H>    Ca    8.2<L>      13 Jul 2024 06:37  Phos  3.3     07-13  Mg     1.6     07-13    TPro  6.7  /  Alb  3.2<L>  /  TBili  0.5  /  DBili      /  AST  25  /  ALT  32  /  AlkPhos  76  07-13    Creatinine Trend: 1.51 <--, 2.04 <--, 2.80 <--                        11.8   6.43  )-----------( 184      ( 13 Jul 2024 06:37 )             34.5   
Date of Service 07-13-24 @ 09:58    CHIEF COMPLAINT:Patient is a 69y old  Male who presents with a chief complaint of Unstable angina.Pt appears comfortable, no further dizsiness.    	  REVIEW OF SYSTEMS:  CONSTITUTIONAL: No fever, weight loss, or fatigue  EYES: No eye pain, visual disturbances, or discharge  ENT:  No difficulty hearing, tinnitus, vertigo; No sinus or throat pain  NECK: No pain or stiffness  RESPIRATORY: No cough, wheezing, chills or hemoptysis; No Shortness of Breath  CARDIOVASCULAR: No chest pain, palpitations, passing out, dizziness, or leg swelling  GASTROINTESTINAL: No abdominal or epigastric pain. No nausea, vomiting, or hematemesis; No diarrhea or constipation. No melena or hematochezia.  GENITOURINARY: No dysuria, frequency, hematuria, or incontinence  NEUROLOGICAL: No headaches, memory loss, loss of strength, numbness, or tremors  SKIN: No itching, burning, rashes, or lesions   LYMPH Nodes: No enlarged glands  ENDOCRINE: No heat or cold intolerance; No hair loss  MUSCULOSKELETAL: No joint pain or swelling; No muscle, back, or extremity pain  PSYCHIATRIC: No depression, anxiety, mood swings, or difficulty sleeping  HEME/LYMPH: No easy bruising, or bleeding gums  ALLERGY AND IMMUNOLOGIC: No hives or eczema	    PHYSICAL EXAM:  T(C): 37 (07-13-24 @ 08:20), Max: 37 (07-13-24 @ 08:20)  HR: 72 (07-13-24 @ 08:20) (72 - 88)  BP: 147/79 (07-13-24 @ 08:20) (106/58 - 147/79)  RR: 19 (07-13-24 @ 08:20) (18 - 19)  SpO2: 97% (07-13-24 @ 08:20) (94% - 99%)  Wt(kg): --  I&O's Summary      Appearance: Normal	  HEENT:   Normal oral mucosa, PERRL, EOMI	  Lymphatic: No lymphadenopathy  Cardiovascular: Normal S1 S2, No JVD, No murmurs, No edema  Respiratory: Lungs clear to auscultation	  Psychiatry: A & O x 3, Mood & affect appropriate  Gastrointestinal:  Soft, Non-tender, + BS	  Skin: No rashes, No ecchymoses, No cyanosis	  Neurologic: Non-focal  Extremities: Normal range of motion, No clubbing, cyanosis or edema  Vascular: Peripheral pulses palpable 2+ bilaterally    MEDICATIONS  (STANDING):  aspirin  chewable 81 milliGRAM(s) Oral daily  atorvastatin 80 milliGRAM(s) Oral at bedtime  heparin   Injectable 5000 Unit(s) SubCutaneous every 12 hours  insulin lispro (ADMELOG) corrective regimen sliding scale   SubCutaneous three times a day before meals  insulin lispro (ADMELOG) corrective regimen sliding scale   SubCutaneous at bedtime  labetalol 100 milliGRAM(s) Oral two times a day  lactated ringers. 1000 milliLiter(s) (80 mL/Hr) IV Continuous <Continuous>  pantoprazole    Tablet 40 milliGRAM(s) Oral before breakfast  phenylephrine 0.25% Suppository 1 Suppository(s) Rectal daily  potassium chloride    Tablet ER 40 milliEquivalent(s) Oral once  ranolazine 1000 milliGRAM(s) Oral two times a day  senna 2 Tablet(s) Oral at bedtime  tamsulosin 0.4 milliGRAM(s) Oral at bedtime      TELEMETRY: 	nsr    	  	  LABS:	 	    Troponin I, High Sensitivity Result: 20.1 ng/L (07-11 @ 13:45)  Troponin I, High Sensitivity Result: 27.9 ng/L (07-11 @ 10:50)                            11.8   6.43  )-----------( 184      ( 13 Jul 2024 06:37 )             34.5     07-13    141  |  106  |  20<H>  ----------------------------<  78  3.1<L>   |  28  |  1.51<H>    Ca    8.2<L>      13 Jul 2024 06:37  Phos  3.3     07-13  Mg     1.6     07-13    TPro  6.7  /  Alb  3.2<L>  /  TBili  0.5  /  DBili  x   /  AST  25  /  ALT  32  /  AlkPhos  76  07-13    proBNP:   Lipid Profile: Cholesterol 72  LDL --  HDL 26    Ldl calc 14  Ratio --    HgA1c:   TSH:     	        
[facility] - KIRILL Pitts / KIRILL Mccann / ZAKIYA Rowell/ KIRILL Lambert/ KIRILL Donovan/ KIMBERLY Flor / LIVAN Hook / EMETERIO Irizarry / TERESA Esteban  ---------------------------------------------------------------------------------------------------------------  seen and examined today for RANDELL  Interval : serum creatinine improving  VITALS:  T(F): 97.7 (07-14-24 @ 08:26), Max: 98.5 (07-14-24 @ 05:13)  HR: 77 (07-14-24 @ 08:26)  BP: 136/75 (07-14-24 @ 08:26)  RR: 19 (07-14-24 @ 08:26)  SpO2: 96% (07-14-24 @ 08:26)  Wt(kg): --    07-13 @ 07:01  -  07-14 @ 07:00  --------------------------------------------------------  IN: 960 mL / OUT: 0 mL / NET: 960 mL      Physical Exam :-  Constitutional: NAD  Neck: Supple.  Respiratory: Bilateral equal breath sounds,  Cardiovascular: S1, S2 normal,  Gastrointestinal: Bowel Sounds present, soft, non tender.  Extremities: trace edema  Neurological: Alert and Oriented x 3, no focal deficits  Psychiatric: Normal mood, normal affect  Data:-  Allergies :   No Known Allergies    Hospital Medications:   MEDICATIONS  (STANDING):  aspirin  chewable 81 milliGRAM(s) Oral daily  atorvastatin 80 milliGRAM(s) Oral at bedtime  heparin   Injectable 5000 Unit(s) SubCutaneous every 12 hours  insulin lispro (ADMELOG) corrective regimen sliding scale   SubCutaneous three times a day before meals  insulin lispro (ADMELOG) corrective regimen sliding scale   SubCutaneous at bedtime  metoprolol succinate ER 25 milliGRAM(s) Oral daily  pantoprazole    Tablet 40 milliGRAM(s) Oral before breakfast  phenylephrine 0.25% Suppository 1 Suppository(s) Rectal daily  potassium chloride    Tablet ER 40 milliEquivalent(s) Oral once  ranolazine 1000 milliGRAM(s) Oral two times a day  senna 2 Tablet(s) Oral at bedtime  sodium chloride 0.9%. 1000 milliLiter(s) (50 mL/Hr) IV Continuous <Continuous>  tamsulosin 0.4 milliGRAM(s) Oral at bedtime    07-14    141  |  108  |  18  ----------------------------<  72  3.2<L>   |  27  |  1.35<H>    Ca    7.9<L>      14 Jul 2024 05:31  Phos  2.6     07-14  Mg     1.3     07-14    TPro  6.8  /  Alb  3.3<L>  /  TBili  0.5  /  DBili      /  AST  22  /  ALT  27  /  AlkPhos  72  07-14    Creatinine Trend: 1.35 <--, 1.51 <--, 2.04 <--, 2.80 <--                        11.8   6.73  )-----------( 193      ( 14 Jul 2024 05:31 )             34.5   
Date of Service 07-14-24 @ 09:57    CHIEF COMPLAINT:Patient is a 69y old  Male who presents with a chief complaint of Unstable angina.Pt appears comfortable,no dizziness when he gets up, +orthostatic BP.    	  REVIEW OF SYSTEMS:  CONSTITUTIONAL: No fever, weight loss, or fatigue  EYES: No eye pain, visual disturbances, or discharge  ENT:  No difficulty hearing, tinnitus, vertigo; No sinus or throat pain  NECK: No pain or stiffness  RESPIRATORY: No cough, wheezing, chills or hemoptysis; No Shortness of Breath  CARDIOVASCULAR: No chest pain, palpitations, passing out, dizziness, or leg swelling  GASTROINTESTINAL: No abdominal or epigastric pain. No nausea, vomiting, or hematemesis; No diarrhea or constipation. No melena or hematochezia.  GENITOURINARY: No dysuria, frequency, hematuria, or incontinence  NEUROLOGICAL: No headaches, memory loss, loss of strength, numbness, or tremors  SKIN: No itching, burning, rashes, or lesions   LYMPH Nodes: No enlarged glands  ENDOCRINE: No heat or cold intolerance; No hair loss  MUSCULOSKELETAL: No joint pain or swelling; No muscle, back, or extremity pain  PSYCHIATRIC: No depression, anxiety, mood swings, or difficulty sleeping  HEME/LYMPH: No easy bruising, or bleeding gums  ALLERGY AND IMMUNOLOGIC: No hives or eczema	      PHYSICAL EXAM:  T(C): 36.5 (07-14-24 @ 08:26), Max: 36.9 (07-13-24 @ 11:20)  HR: 77 (07-14-24 @ 08:26) (68 - 79)  BP: 136/75 (07-14-24 @ 08:26) (133/66 - 153/99)  RR: 19 (07-14-24 @ 08:26) (18 - 19)  SpO2: 96% (07-14-24 @ 08:26) (95% - 98%)  Wt(kg): --  I&O's Summary    13 Jul 2024 07:01  -  14 Jul 2024 07:00  --------------------------------------------------------  IN: 960 mL / OUT: 0 mL / NET: 960 mL        Appearance: Normal	  HEENT:   Normal oral mucosa, PERRL, EOMI	  Lymphatic: No lymphadenopathy  Cardiovascular: Normal S1 S2, No JVD, No murmurs, No edema  Respiratory: Lungs clear to auscultation	  Psychiatry: A & O x 3, Mood & affect appropriate  Gastrointestinal:  Soft, Non-tender, + BS	  Skin: No rashes, No ecchymoses, No cyanosis	  Neurologic: Non-focal  Extremities: Normal range of motion, No clubbing, cyanosis or edema  Vascular: Peripheral pulses palpable 2+ bilaterally    MEDICATIONS  (STANDING):  aspirin  chewable 81 milliGRAM(s) Oral daily  atorvastatin 80 milliGRAM(s) Oral at bedtime  heparin   Injectable 5000 Unit(s) SubCutaneous every 12 hours  insulin lispro (ADMELOG) corrective regimen sliding scale   SubCutaneous three times a day before meals  insulin lispro (ADMELOG) corrective regimen sliding scale   SubCutaneous at bedtime  metoprolol succinate ER 25 milliGRAM(s) Oral daily  pantoprazole    Tablet 40 milliGRAM(s) Oral before breakfast  phenylephrine 0.25% Suppository 1 Suppository(s) Rectal daily  potassium chloride    Tablet ER 40 milliEquivalent(s) Oral once  ranolazine 1000 milliGRAM(s) Oral two times a day  senna 2 Tablet(s) Oral at bedtime  sodium chloride 0.9%. 1000 milliLiter(s) (50 mL/Hr) IV Continuous <Continuous>  tamsulosin 0.4 milliGRAM(s) Oral at bedtime      TELEMETRY:nsr 	      	  LABS:	 	      Troponin I, High Sensitivity Result: 20.1 ng/L (07-11 @ 13:45)  Troponin I, High Sensitivity Result: 27.9 ng/L (07-11 @ 10:50)                            11.8   6.73  )-----------( 193      ( 14 Jul 2024 05:31 )             34.5     07-14    141  |  108  |  18  ----------------------------<  72  3.2<L>   |  27  |  1.35<H>    Ca    7.9<L>      14 Jul 2024 05:31  Phos  2.6     07-14  Mg     1.3     07-14    TPro  6.8  /  Alb  3.3<L>  /  TBili  0.5  /  DBili  x   /  AST  22  /  ALT  27  /  AlkPhos  72  07-14      Lipid Profile: Cholesterol 72  HDL 26    Ldl calc 14          < from: TTE W or WO Ultrasound Enhancing Agent (07.12.24 @ 14:06) >  CONCLUSIONS:      1. Technically difficult image quality.   2. Left ventricular systolic function is normal with an ejection fraction of 66 % by Regan's method of disks.   3. There is normal LV mass and concentric remodeling.   4.There is mild (grade 1) left ventricular diastolic dysfunction.   5. Normal right ventricular cavity size, with normal wall thickness, and normal right ventricular systolic function.    ________________________________________________________________________________________  FINDINGS:     Left Ventricle:  Left ventricular systolic function is normal with a calculated ejection fraction of 66 % by the Regan's biplane method of disks. There is mild (grade 1) left ventricular diastolic dysfunction.There is normal LV mass and concentric remodeling.     Right Ventricle:  The right ventricular cavity is normal in size, with normal wall thickness and right ventricular systolic function is normal.     Left Atrium:  The left atrium is normal in size.     Right Atrium:  The right atrium is normal in size with an indexed area of 7.30 cm²/m².     Aortic Valve:  The aortic valve anatomy cannot be determined with normal systolic excursion.     Mitral Valve:  There is trace mitral regurgitation.     Tricuspid Valve:  There is trace tricuspid regurgitation. There is insufficient tricuspid regurgitation detected to calculate pulmonary artery systolic pressure.     Pulmonic Valve:  There is trace pulmonic regurgitation.     Aorta:  The aortic root appears normal in size.     Pericardium:  No pericardial effusion seen.     Systemic Veins:  The inferior vena cava is normal in size (normal <2.1cm) with normal inspiratory collapse (normal >50%) consistent with normal right atrial pressure (~3, range 0-5mmHg).    < end of copied text >  
NP Note discussed with  Primary Attending    Patient is a 69y old  Male who presents with a chief complaint of Unstable angina (12 Jul 2024 13:08)      INTERVAL HPI/OVERNIGHT EVENTS:  70 yo M with PMHx of HTN, DM, BPH, CAD s/p 3 stents (2019) s/p CABG   present with dizziness ( room spinning sensation), constant sharp left sided chest pain that radiates to his upper back. Accompanied with 2 days history of dry cough. Patient reports dizziness and chest pain that make him fell to the ground. Patient denies any head trauma, LOC. Patient states the chest pain is worse with exertion but improves with rest. Admitted to telemetry for ACS r/o, Dizziness, RANDELL    CTA with no acute findings, 3.2 cm gallstone in the underdistended gallbladder. CTH also with no acute findings, Mild chronic white matter microvascular type changes. Cardiology following. Neuro consulted for dizziness    As per pt, all symptoms were resolved    MEDICATIONS  (STANDING):  aspirin  chewable 81 milliGRAM(s) Oral daily  atorvastatin 80 milliGRAM(s) Oral at bedtime  heparin   Injectable 5000 Unit(s) SubCutaneous every 12 hours  insulin lispro (ADMELOG) corrective regimen sliding scale   SubCutaneous three times a day before meals  insulin lispro (ADMELOG) corrective regimen sliding scale   SubCutaneous at bedtime  labetalol 100 milliGRAM(s) Oral two times a day  lactated ringers. 1000 milliLiter(s) (80 mL/Hr) IV Continuous <Continuous>  phenylephrine 0.25% Suppository 1 Suppository(s) Rectal daily  ranolazine 1000 milliGRAM(s) Oral two times a day  senna 2 Tablet(s) Oral at bedtime  tamsulosin 0.4 milliGRAM(s) Oral at bedtime    MEDICATIONS  (PRN):  meclizine 12.5 milliGRAM(s) Oral two times a day PRN Dizziness      __________________________________________________  REVIEW OF SYSTEMS:    CONSTITUTIONAL: No fever,   EYES: no acute visual disturbances  NECK: No pain or stiffness  RESPIRATORY: No cough; No shortness of breath  CARDIOVASCULAR: No chest pain, no palpitations  GASTROINTESTINAL: No pain. No nausea or vomiting; No diarrhea   NEUROLOGICAL: No headache or numbness, no tremors  MUSCULOSKELETAL: No joint pain, no muscle pain  GENITOURINARY: no dysuria, no frequency, no hesitancy  ALL OTHER  ROS negative        Vital Signs Last 24 Hrs  T(C): 36.7 (12 Jul 2024 16:17), Max: 36.9 (11 Jul 2024 19:51)  T(F): 98 (12 Jul 2024 16:17), Max: 98.5 (11 Jul 2024 19:51)  HR: 73 (12 Jul 2024 16:17) (72 - 81)  BP: 137/86 (12 Jul 2024 16:17) (121/71 - 155/83)  BP(mean): 96 (12 Jul 2024 05:00) (96 - 105)  RR: 18 (12 Jul 2024 16:17) (18 - 19)  SpO2: 98% (12 Jul 2024 16:17) (96% - 100%)    Parameters below as of 12 Jul 2024 16:17  Patient On (Oxygen Delivery Method): room air        ________________________________________________  PHYSICAL EXAM:  GENERAL: NAD  HEENT: Normocephalic; moist mucous membranes;  NECK : supple  CHEST/LUNG: Clear to auscultation bilaterally with good air entry   HEART: S1 S2  regular;  ABDOMEN: Soft, Nontender, Nondistended; Bowel sounds present  EXTREMITIES: no cyanosis; + edema; no calf tenderness  SKIN: warm and dry; no rash  NERVOUS SYSTEM:  Awake and alert; Oriented to place, person and time    _________________________________________________  LABS:                        11.8   7.92  )-----------( 194      ( 12 Jul 2024 07:53 )             34.6     07-12    139  |  104  |  29<H>  ----------------------------<  81  3.0<L>   |  26  |  2.04<H>    Ca    8.5      12 Jul 2024 07:53  Phos  3.5     07-12  Mg     1.7     07-12    TPro  7.0  /  Alb  3.1<L>  /  TBili  0.4  /  DBili  x   /  AST  34  /  ALT  35  /  AlkPhos  79  07-12    PT/INR - ( 11 Jul 2024 10:50 )   PT: 11.9 sec;   INR: 1.05 ratio         PTT - ( 11 Jul 2024 10:50 )  PTT:31.6 sec  Urinalysis Basic - ( 12 Jul 2024 07:53 )    Color: x / Appearance: x / SG: x / pH: x  Gluc: 81 mg/dL / Ketone: x  / Bili: x / Urobili: x   Blood: x / Protein: x / Nitrite: x   Leuk Esterase: x / RBC: x / WBC x   Sq Epi: x / Non Sq Epi: x / Bacteria: x      CAPILLARY BLOOD GLUCOSE      POCT Blood Glucose.: 88 mg/dL (12 Jul 2024 16:46)  POCT Blood Glucose.: 113 mg/dL (12 Jul 2024 11:21)  POCT Blood Glucose.: 96 mg/dL (12 Jul 2024 07:48)  POCT Blood Glucose.: 95 mg/dL (11 Jul 2024 22:39)        RADIOLOGY & ADDITIONAL TESTS:  < from: CT Head No Cont (07.12.24 @ 09:49) >    ACC: [id number] EXAM:  CT BRAIN   ORDERED BY: NEVA DUFFY     PROCEDURE DATE:  07/12/2024          INTERPRETATION:  .    CLINICAL INFORMATION: Dizziness.    TECHNIQUE: Multiple axial CT images of the head were obtained without   contrast. Sagittal and coronal reconstructed images were acquired from   the source data.    COMPARISON: No prior CT studies of the brain are available for comparison   at this institution.    FINDINGS: There is no acute intracranial hemorrhage, mass effect, shift  of the midline structures, herniation, extra-axial fluid collection, or   hydrocephalus.    There is diffuse cerebral volume loss with prominence of the sulci,   fissures, and cisternal spaces which is normal for the patient's age.   There is mild deep and periventricular white matter hypoattenuation   statistically compatible with microvascular changes given calcific   atherosclerotic disease of the intracranial arteries.    Scattered mucosal thickening is seen throughout the paranasal sinuses.   The bilateral tympanomastoid cavities are clear. The calvarium is intact.   The imaged orbits are unremarkable.    IMPRESSION: No acute intracranial hemorrhage, mass effect, or shift of   the midline structures.    Mild chronic white matter microvascular type changes.    --- End of Report ---            DILAN MASON MD; Attending Radiologist  This document has been electronically signed. Jul 12 2024  9:55AM    < end of copied text >    Imaging Personally Reviewed:  YES/NO    Consultant(s) Notes Reviewed:   YES/ No    Care Discussed with Consultants :     Plan of care was discussed with patient and /or primary care giver; all questions and concerns were addressed and care was aligned with patient's wishes.

## 2024-07-14 NOTE — PROGRESS NOTE ADULT - REASON FOR ADMISSION
Unstable angina

## 2024-07-22 RX ORDER — SEMAGLUTIDE 1.34 MG/ML
1 INJECTION, SOLUTION SUBCUTANEOUS
Refills: 0 | DISCHARGE

## 2024-07-22 RX ORDER — ATORVASTATIN CALCIUM 20 MG/1
1 TABLET, FILM COATED ORAL
Refills: 0 | DISCHARGE

## 2024-07-22 RX ORDER — ASPIRIN 325 MG/1
1 TABLET, FILM COATED ORAL
Refills: 0 | DISCHARGE

## 2024-07-22 RX ORDER — TAMSULOSIN HYDROCHLORIDE 0.4 MG/1
1 CAPSULE ORAL
Refills: 0 | DISCHARGE

## 2024-07-22 RX ORDER — LOSARTAN POTASSIUM 100 MG/1
1 TABLET, FILM COATED ORAL
Refills: 0 | DISCHARGE

## 2024-07-22 RX ORDER — LABETALOL HYDROCHLORIDE 300 MG/1
1 TABLET ORAL
Refills: 0 | DISCHARGE

## 2024-07-22 RX ORDER — EZETIMIBE 10 MG/1
1 TABLET ORAL
Refills: 0 | DISCHARGE

## 2024-07-22 RX ORDER — RANOLAZINE 500 MG/1
1000 TABLET, EXTENDED RELEASE ORAL
Refills: 0 | DISCHARGE

## 2024-07-22 RX ORDER — SENNOSIDES 8.6 MG
1 TABLET ORAL
Refills: 0 | DISCHARGE